# Patient Record
Sex: FEMALE | Race: WHITE | NOT HISPANIC OR LATINO | Employment: OTHER
[De-identification: names, ages, dates, MRNs, and addresses within clinical notes are randomized per-mention and may not be internally consistent; named-entity substitution may affect disease eponyms.]

---

## 2017-04-21 ENCOUNTER — RECORDS - HEALTHEAST (OUTPATIENT)
Dept: ADMINISTRATIVE | Facility: OTHER | Age: 62
End: 2017-04-21

## 2017-04-24 ENCOUNTER — TRANSFERRED RECORDS (OUTPATIENT)
Dept: HEALTH INFORMATION MANAGEMENT | Facility: CLINIC | Age: 62
End: 2017-04-24

## 2017-04-24 LAB
HPV ABSTRACT: NORMAL
PAP-ABSTRACT: NORMAL

## 2017-05-03 ENCOUNTER — RECORDS - HEALTHEAST (OUTPATIENT)
Dept: BONE DENSITY | Facility: CLINIC | Age: 62
End: 2017-05-03

## 2017-05-03 ENCOUNTER — HOSPITAL ENCOUNTER (OUTPATIENT)
Dept: MAMMOGRAPHY | Facility: CLINIC | Age: 62
Discharge: HOME | End: 2017-05-03
Attending: OBSTETRICS & GYNECOLOGY

## 2017-05-03 ENCOUNTER — RECORDS - HEALTHEAST (OUTPATIENT)
Dept: ADMINISTRATIVE | Facility: OTHER | Age: 62
End: 2017-05-03

## 2017-05-03 DIAGNOSIS — N95.1 MENOPAUSAL AND FEMALE CLIMACTERIC STATES: ICD-10-CM

## 2017-05-03 DIAGNOSIS — Z12.31 VISIT FOR SCREENING MAMMOGRAM: ICD-10-CM

## 2017-12-05 ENCOUNTER — RECORDS - HEALTHEAST (OUTPATIENT)
Dept: ADMINISTRATIVE | Facility: OTHER | Age: 62
End: 2017-12-05

## 2018-05-25 ENCOUNTER — RECORDS - HEALTHEAST (OUTPATIENT)
Dept: ADMINISTRATIVE | Facility: OTHER | Age: 63
End: 2018-05-25

## 2018-05-29 ENCOUNTER — RECORDS - HEALTHEAST (OUTPATIENT)
Dept: ADMINISTRATIVE | Facility: OTHER | Age: 63
End: 2018-05-29

## 2018-05-29 ENCOUNTER — RECORDS - HEALTHEAST (OUTPATIENT)
Dept: BONE DENSITY | Facility: CLINIC | Age: 63
End: 2018-05-29

## 2018-05-29 DIAGNOSIS — M81.0 AGE-RELATED OSTEOPOROSIS WITHOUT CURRENT PATHOLOGICAL FRACTURE: ICD-10-CM

## 2019-07-15 ENCOUNTER — HOSPITAL ENCOUNTER (OUTPATIENT)
Dept: MAMMOGRAPHY | Facility: CLINIC | Age: 64
Discharge: HOME | End: 2019-07-15
Attending: OBSTETRICS & GYNECOLOGY

## 2019-07-15 DIAGNOSIS — Z12.31 VISIT FOR SCREENING MAMMOGRAM: ICD-10-CM

## 2020-07-27 ENCOUNTER — TRANSFERRED RECORDS (OUTPATIENT)
Dept: HEALTH INFORMATION MANAGEMENT | Facility: CLINIC | Age: 65
End: 2020-07-27
Payer: COMMERCIAL

## 2020-07-30 ENCOUNTER — RECORDS - HEALTHEAST (OUTPATIENT)
Dept: ADMINISTRATIVE | Facility: OTHER | Age: 65
End: 2020-07-30

## 2021-05-25 ENCOUNTER — RECORDS - HEALTHEAST (OUTPATIENT)
Dept: ADMINISTRATIVE | Facility: CLINIC | Age: 66
End: 2021-05-25

## 2021-05-26 ENCOUNTER — RECORDS - HEALTHEAST (OUTPATIENT)
Dept: ADMINISTRATIVE | Facility: CLINIC | Age: 66
End: 2021-05-26

## 2021-05-28 ENCOUNTER — RECORDS - HEALTHEAST (OUTPATIENT)
Dept: ADMINISTRATIVE | Facility: CLINIC | Age: 66
End: 2021-05-28

## 2021-05-29 ENCOUNTER — RECORDS - HEALTHEAST (OUTPATIENT)
Dept: ADMINISTRATIVE | Facility: CLINIC | Age: 66
End: 2021-05-29

## 2021-05-30 ENCOUNTER — RECORDS - HEALTHEAST (OUTPATIENT)
Dept: ADMINISTRATIVE | Facility: CLINIC | Age: 66
End: 2021-05-30

## 2021-06-22 ENCOUNTER — OFFICE VISIT - HEALTHEAST (OUTPATIENT)
Dept: FAMILY MEDICINE | Facility: CLINIC | Age: 66
End: 2021-06-22

## 2021-06-22 ENCOUNTER — RECORDS - HEALTHEAST (OUTPATIENT)
Dept: ADMINISTRATIVE | Facility: OTHER | Age: 66
End: 2021-06-22

## 2021-06-22 DIAGNOSIS — Z12.31 ENCOUNTER FOR SCREENING MAMMOGRAM FOR BREAST CANCER: ICD-10-CM

## 2021-06-22 DIAGNOSIS — Z13.0 SCREENING, ANEMIA, DEFICIENCY, IRON: ICD-10-CM

## 2021-06-22 DIAGNOSIS — Z11.59 ENCOUNTER FOR HEPATITIS C SCREENING TEST FOR LOW RISK PATIENT: ICD-10-CM

## 2021-06-22 DIAGNOSIS — M81.0 OSTEOPOROSIS, UNSPECIFIED OSTEOPOROSIS TYPE, UNSPECIFIED PATHOLOGICAL FRACTURE PRESENCE: ICD-10-CM

## 2021-06-22 DIAGNOSIS — E78.49 OTHER HYPERLIPIDEMIA: ICD-10-CM

## 2021-06-22 DIAGNOSIS — Z13.228 ENCOUNTER FOR SCREENING FOR METABOLIC DISORDER: ICD-10-CM

## 2021-06-22 DIAGNOSIS — Z00.00 ROUTINE GENERAL MEDICAL EXAMINATION AT A HEALTH CARE FACILITY: ICD-10-CM

## 2021-06-22 DIAGNOSIS — Z00.00 ENCOUNTER FOR PREVENTIVE CARE: ICD-10-CM

## 2021-06-22 LAB
ALBUMIN SERPL-MCNC: 4.3 G/DL (ref 3.5–5)
ALP SERPL-CCNC: 93 U/L (ref 45–120)
ALT SERPL W P-5'-P-CCNC: 15 U/L (ref 0–45)
ANION GAP SERPL CALCULATED.3IONS-SCNC: 12 MMOL/L (ref 5–18)
AST SERPL W P-5'-P-CCNC: 17 U/L (ref 0–40)
BASOPHILS # BLD AUTO: 0 THOU/UL (ref 0–0.2)
BASOPHILS NFR BLD AUTO: 1 % (ref 0–2)
BILIRUB SERPL-MCNC: 0.8 MG/DL (ref 0–1)
BUN SERPL-MCNC: 10 MG/DL (ref 8–22)
CALCIUM SERPL-MCNC: 9.2 MG/DL (ref 8.5–10.5)
CHLORIDE BLD-SCNC: 105 MMOL/L (ref 98–107)
CHOLEST SERPL-MCNC: 204 MG/DL
CO2 SERPL-SCNC: 23 MMOL/L (ref 22–31)
CREAT SERPL-MCNC: 0.68 MG/DL (ref 0.6–1.1)
EOSINOPHIL # BLD AUTO: 0 THOU/UL (ref 0–0.4)
EOSINOPHIL NFR BLD AUTO: 1 % (ref 0–6)
ERYTHROCYTE [DISTWIDTH] IN BLOOD BY AUTOMATED COUNT: 12.6 % (ref 11–14.5)
FASTING STATUS PATIENT QL REPORTED: YES
GFR SERPL CREATININE-BSD FRML MDRD: >60 ML/MIN/1.73M2
GLUCOSE BLD-MCNC: 89 MG/DL (ref 70–125)
HCT VFR BLD AUTO: 40.1 % (ref 35–47)
HDLC SERPL-MCNC: 61 MG/DL
HGB BLD-MCNC: 13.4 G/DL (ref 12–16)
IMM GRANULOCYTES # BLD: 0 THOU/UL
IMM GRANULOCYTES NFR BLD: 0 %
LDLC SERPL CALC-MCNC: 131 MG/DL
LYMPHOCYTES # BLD AUTO: 0.9 THOU/UL (ref 0.8–4.4)
LYMPHOCYTES NFR BLD AUTO: 22 % (ref 20–40)
MCH RBC QN AUTO: 29.6 PG (ref 27–34)
MCHC RBC AUTO-ENTMCNC: 33.4 G/DL (ref 32–36)
MCV RBC AUTO: 89 FL (ref 80–100)
MONOCYTES # BLD AUTO: 0.5 THOU/UL (ref 0–0.9)
MONOCYTES NFR BLD AUTO: 11 % (ref 2–10)
NEUTROPHILS # BLD AUTO: 2.7 THOU/UL (ref 2–7.7)
NEUTROPHILS NFR BLD AUTO: 65 % (ref 50–70)
PLATELET # BLD AUTO: 257 THOU/UL (ref 140–440)
PMV BLD AUTO: 9.5 FL (ref 7–10)
POTASSIUM BLD-SCNC: 4.3 MMOL/L (ref 3.5–5)
PROT SERPL-MCNC: 6.7 G/DL (ref 6–8)
RBC # BLD AUTO: 4.52 MILL/UL (ref 3.8–5.4)
SODIUM SERPL-SCNC: 140 MMOL/L (ref 136–145)
TRIGL SERPL-MCNC: 62 MG/DL
WBC: 4.1 THOU/UL (ref 4–11)

## 2021-06-22 ASSESSMENT — MIFFLIN-ST. JEOR: SCORE: 1092.91

## 2021-06-22 NOTE — ASSESSMENT & PLAN NOTE
Patient does weight bearing exercises but interested in physical therapy to optimize her bone denisty.    She does not want bisphosphonate, but is aware of that option. May reconsider if bone density is worsening. Repeat dexa due now, order placed.

## 2021-06-23 LAB
25(OH)D3 SERPL-MCNC: 63.8 NG/ML (ref 30–80)
HCV AB SERPL QL IA: NEGATIVE

## 2021-06-26 ENCOUNTER — HEALTH MAINTENANCE LETTER (OUTPATIENT)
Age: 66
End: 2021-06-26

## 2021-06-26 NOTE — PATIENT INSTRUCTIONS - HE
Advance Directive  Patient s advance directive was discussed and I am comfortable with the patient s wishes.  Patient Education   Personalized Prevention Plan  You are due for the preventive services outlined below.  Your care team is available to assist you in scheduling these services.  If you have already completed any of these items, please share that information with your care team to update in your medical record.  Health Maintenance Due   Topic Date Due     HEPATITIS C SCREENING  Never done     ADVANCE CARE PLANNING  Never done     ZOSTER VACCINES (2 of 3) 06/15/2015     Pneumococcal Vaccine: 65+ Years (1 of 1 - PPSV23) Never done     COLORECTAL CANCER SCREENING  05/26/2020     LIPID  04/18/2021     MAMMOGRAM  07/15/2021

## 2021-06-26 NOTE — PROGRESS NOTES
Assessment and Plan:   Patient has been advised of split billing requirements and indicates understanding: yes    Problem List Items Addressed This Visit        Unprioritized    Osteoporosis     Patient does weight bearing exercises but interested in physical therapy to optimize her bone denisty.    She does not want bisphosphonate, but is aware of that option. May reconsider if bone density is worsening. Repeat dexa due now, order placed.          Relevant Orders    Ambulatory referral to Adult PT- Internal    Vitamin D, Total (25-Hydroxy)    DXA Bone Density Scan    Other hyperlipidemia     Manages with diet /and naturopath medicine Dr. Hansen.          Relevant Orders    Lipid [place] FASTING    Encounter for preventive care     Covid vaccine - done  Flu shot - recommended in the fall.   Pap: done about 2 years ago by dr. Keen, we will get record.   Mammo: normal 2020 - will get record.   Colonoscopy: done last year, and was normal, repeat in 10 years, will get records.   Std testing desired: declined. but offered  Osteoporosis prevention discussed.  vitamin d levels ordered. Recommend daily calcium and vitamin d intake to keep good bone health. Recommend weight bearing exercise, no tobacco, and limit alcohol  dexa - see osteoporosis.   Recommend sunscreen, exercise, & healthy diet.  Offered cbc, cmp, lipids and asked what other testing she  desires today  I have had an Advance Directives discussion with the patient. She has one and will get a copy.  Body mass index is 22.8 kg/m .   mychart active.            Other Visit Diagnoses     Routine general medical examination at a health care facility    -  Primary    Encounter for screening mammogram for breast cancer        Relevant Orders    Mammo Screening Bilateral    Encounter for hepatitis C screening test for low risk patient        Relevant Orders    Hepatitis C Antibody (Anti-HCV)    Screening, anemia, deficiency, iron        Relevant Orders    HM1(CBC  and Differential)    Encounter for screening for metabolic disorder        Relevant Orders    Comprehensive Metabolic Panel           The patient's current medical problems were reviewed.    I have had an Advance Directives discussion with the patient.  The following health maintenance schedule was reviewed with the patient and provided in printed form in the after visit summary:   Health Maintenance   Topic Date Due     ZOSTER VACCINES (2 of 3) 06/15/2015     Pneumococcal Vaccine: 65+ Years (1 of 1 - PPSV23) Never done     COLORECTAL CANCER SCREENING  05/26/2020     LIPID  04/18/2021     MAMMOGRAM  07/15/2021     INFLUENZA VACCINE RULE BASED (Season Ended) 08/01/2021     MEDICARE ANNUAL WELLNESS VISIT  06/22/2022     FALL RISK ASSESSMENT  06/22/2022     TD 18+ HE  04/14/2025     ADVANCE CARE PLANNING  06/22/2026     DEXA SCAN  05/29/2033     COVID-19 Vaccine  Completed     Pneumococcal Vaccine: Pediatrics (0 to 5 Years) and At-Risk Patients (6 to 64 Years)  Aged Out     HEPATITIS B VACCINES  Aged Out     HEPATITIS C SCREENING  Discontinued       Subjective:   Chief Complaint: Alexia Ayers is an 66 y.o. female here for an Annual Wellness visit.   HPI:  Healthy comes in for establish care and annual wellness visit.     Review of Systems:  Please see above.  The rest of the review of systems are negative for all systems.    Patient Care Team:  Gabrielle Evans MD as PCP - General (Family Medicine)  Jorge Longoria MD [place], deb as Integrated Services ([facility])     Patient Active Problem List   Diagnosis     Basal Cell Carcinoma Of The Skin     Menopause Has Occurred     Osteoporosis     Other hyperlipidemia     Encounter for preventive care     Past Medical History:   Diagnosis Date     Basal cell carcinoma (BCC) of face      Cataract early stages, no treatment     Hyperthyroidism      Infectious viral hepatitis Had as small child hepatitis A      Past Surgical History:   Procedure Laterality Date      APPENDECTOMY  1998     BASAL CELL CARCINOMA EXCISION       COSMETIC SURGERY  reconstruction on face after mohs procedure     HYSTERECTOMY  1998     OOPHORECTOMY Bilateral 1998      Family History   Problem Relation Age of Onset     Breast cancer Paternal Grandmother 60     Lymphoma Mother      Cancer Mother         non-hodgkins lymphoma     Stroke Father 48        carotid aneurysm     Heart attack Brother 48     Early death Brother         heart attack     Heart disease Brother      Obesity Brother      No Medical Problems Daughter      No Medical Problems Daughter      Prostate cancer Brother      Colon cancer Neg Hx       Social History     Socioeconomic History     Marital status:      Spouse name: Not on file     Number of children: Not on file     Years of education: Not on file     Highest education level: Not on file   Occupational History     Not on file   Social Needs     Financial resource strain: Not on file     Food insecurity     Worry: Not on file     Inability: Not on file     Transportation needs     Medical: Not on file     Non-medical: Not on file   Tobacco Use     Smoking status: Never Smoker   Substance and Sexual Activity     Alcohol use: Yes     Alcohol/week: 4.0 standard drinks     Types: 4 Glasses of wine per week     Drug use: No     Sexual activity: Yes     Partners: Male     Birth control/protection: None   Lifestyle     Physical activity     Days per week: Not on file     Minutes per session: Not on file     Stress: Not on file   Relationships     Social connections     Talks on phone: Not on file     Gets together: Not on file     Attends Christianity service: Not on file     Active member of club or organization: Not on file     Attends meetings of clubs or organizations: Not on file     Relationship status: Not on file     Intimate partner violence     Fear of current or ex partner: Not on file     Emotionally abused: Not on file     Physically abused: Not on file     Forced sexual  "activity: Not on file   Other Topics Concern     Not on file   Social History Narrative    6/22/2021 lives with  myke. 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 Likes yoga/ pilates/ traveling/ walking/ knitting/ hanging out with friends/ likes to cook.       Current Outpatient Medications   Medication Sig Dispense Refill     ERGOCALCIFEROL, VITAMIN D2, (VITAMIN D2 ORAL) Take 6,000 Units by mouth daily.        UNABLE TO FIND Med Name: Methylcare bid       UNABLE TO FIND Med Name: Resvoxitrol bid       UNABLE TO FIND Med Name: Osteoblend two times a day       UNABLE TO FIND Med Name: Nattokinase bi       UNABLE TO FIND Med Name: Cortisol manager       UNABLE TO FIND Med Name: Progon/B bid       No current facility-administered medications for this visit.       Objective:   Vital Signs:   Visit Vitals  /74 (Patient Site: Left Arm, Patient Position: Sitting, Cuff Size: Adult Regular)   Pulse 86   Ht 5' 3\" (1.6 m)   Wt 128 lb 11.2 oz (58.4 kg)   SpO2 97%   BMI 22.80 kg/m           VisionScreening:  No exam data present     PHYSICAL EXAM  Physical Exam  Constitutional:       General: She is not in acute distress.     Appearance: She is well-developed.   HENT:      Head: Normocephalic and atraumatic.      Right Ear: Tympanic membrane, ear canal and external ear normal.      Left Ear: Tympanic membrane, ear canal and external ear normal.      Nose: Nose normal.      Mouth/Throat:      Mouth: Mucous membranes are moist.      Pharynx: Oropharynx is clear.   Eyes:      Extraocular Movements: Extraocular movements intact.      Conjunctiva/sclera: Conjunctivae normal.   Neck:      Musculoskeletal: Neck supple.   Cardiovascular:      Rate and Rhythm: Normal rate and regular rhythm.      Heart sounds: Normal heart sounds.   Pulmonary:      Effort: Pulmonary effort is normal.      Breath sounds: Normal breath sounds.   Chest:      Breasts: Breasts are symmetrical.         Right: Normal. No " swelling, bleeding, inverted nipple, mass, nipple discharge, skin change or tenderness.         Left: Normal. No swelling, bleeding, inverted nipple, mass, nipple discharge, skin change or tenderness.   Abdominal:      General: Bowel sounds are normal.      Palpations: Abdomen is soft.   Musculoskeletal: Normal range of motion.   Skin:     General: Skin is warm and dry.   Neurological:      General: No focal deficit present.      Mental Status: She is alert and oriented to person, place, and time.   Psychiatric:         Mood and Affect: Mood normal.         Behavior: Behavior normal.         Thought Content: Thought content normal.         Judgment: Judgment normal.           Assessment Results 6/22/2021   Activities of Daily Living No help needed   Instrumental Activities of Daily Living No help needed   Mini Cog Total Score 5   Some recent data might be hidden     A Mini-Cog score of 0-2 suggests the possibility of dementia, score of 3-5 suggests no dementia    Identified Health Risks:     Patient's advanced directive was discussed and I am comfortable with the patient's wishes.  She has a health care directive.

## 2021-06-29 ENCOUNTER — COMMUNICATION - HEALTHEAST (OUTPATIENT)
Dept: FAMILY MEDICINE | Facility: CLINIC | Age: 66
End: 2021-06-29

## 2021-06-29 DIAGNOSIS — E78.49 OTHER HYPERLIPIDEMIA: ICD-10-CM

## 2021-07-05 PROBLEM — Z00.00 ENCOUNTER FOR PREVENTIVE CARE: Status: ACTIVE | Noted: 2021-06-22

## 2021-07-06 VITALS
SYSTOLIC BLOOD PRESSURE: 132 MMHG | HEIGHT: 63 IN | OXYGEN SATURATION: 97 % | HEART RATE: 86 BPM | DIASTOLIC BLOOD PRESSURE: 74 MMHG | WEIGHT: 128.7 LBS | BODY MASS INDEX: 22.8 KG/M2

## 2021-07-13 ENCOUNTER — RECORDS - HEALTHEAST (OUTPATIENT)
Dept: ADMINISTRATIVE | Facility: CLINIC | Age: 66
End: 2021-07-13

## 2021-07-19 ENCOUNTER — HOSPITAL ENCOUNTER (OUTPATIENT)
Dept: PHYSICAL THERAPY | Facility: REHABILITATION | Age: 66
End: 2021-07-19
Payer: COMMERCIAL

## 2021-07-19 DIAGNOSIS — M25.69 DECREASED RANGE OF MOTION OF TRUNK AND BACK: ICD-10-CM

## 2021-07-19 DIAGNOSIS — M62.81 MUSCLE WEAKNESS (GENERALIZED): ICD-10-CM

## 2021-07-19 DIAGNOSIS — M81.0 OSTEOPOROSIS WITHOUT CURRENT PATHOLOGICAL FRACTURE, UNSPECIFIED OSTEOPOROSIS TYPE: Primary | ICD-10-CM

## 2021-07-19 PROCEDURE — 97161 PT EVAL LOW COMPLEX 20 MIN: CPT | Mod: GP | Performed by: PHYSICAL THERAPIST

## 2021-07-19 PROCEDURE — 97110 THERAPEUTIC EXERCISES: CPT | Mod: GP | Performed by: PHYSICAL THERAPIST

## 2021-07-19 NOTE — DISCHARGE INSTRUCTIONS
STRENGTHENING routine      Try to do with chair behind you so you stay consistent with how deep your squat is.   Squat to stand to bicep curl to overhead press - start with 3 lbs - when 2-3 rounds of 10-15 reps is easy - can move up to 5 lbs      Split squat x10-15 reps 2-3 sets      Counter push ups x10-15 reps 2-3 reps     Back strengthening    x10-15 reps 2-3 sets    STRETCHING - ideally performed daily - after walk or video might feel more stretch ability    Trying to keep elbows and hands touching wall as you raise your arms up overhead x5-10 reps    Variation to try as well (gravity helps so might feel like you can stretch farther)    x5-10 reps

## 2021-07-20 NOTE — PROGRESS NOTES
[facility]        OUTPATIENT PHYSICAL THERAPY FUNCTIONAL EVALUATION  PLAN OF TREATMENT FOR OUTPATIENT REHABILITATION  (COMPLETE FOR INITIAL CLAIMS ONLY)  Patient's Last Name, First Name, M.I.  YOB: 1955  Alexia Ayers     Provider's Name   [facility]   Medical Record No.  [id number]     Start of Care Date:  07/19/21   Onset Date:  06/02/21   Type:     _X__PT   ____OT  ____SLP Medical Diagnosis:  Osteoporosis     PT Diagnosis:  Osteoporosis at risk for fracture with decreased ROM and strength Visits from SOC:  1                              __________________________________________________________________________________  Plan of Treatment/Functional Goals:  neuromuscular re-education, ROM, strengthening (weight bearing osteoporosis home exercise program/postural)           GOALS     Pt to be educated and able to perform weight bearing and postural osteoporosis exercise programming independently and without difficulty in 16 weeks.  11/09/21      Therapy Frequency:  other (see comments) (1x/every 4-6 weeks)   Predicted Duration of Therapy Intervention:   (1x/every 4-6 weeks for 12-16 weeks)    Radha Garcia, PT, DPT, OCS, CLT                                   I CERTIFY THE NEED FOR THESE SERVICES FURNISHED UNDER        THIS PLAN OF TREATMENT AND WHILE UNDER MY CARE     (Physician co-signature of this document indicates review and certification of the therapy plan).                Certification Date From:  07/19/21   Certification Date To:  10/18/21    Referring Provider:  Nathan    Initial Assessment  See Epic Evaluation- Start of Care Date: 07/19/21 07/19/21 1611   Quick Adds   Quick Adds Certification   Type of Visit Initial OP PT Evaluation   General Information   Start of Care Date 07/19/21   Referring Physician Nathan   Orders Evaluate and Treat  as Indicated   Order Date 06/02/21   Medical Diagnosis Osteoporosis   Onset of illness/injury or Date of Surgery 06/02/21   Special Instructions Early 2000s pt's bone density scans showed bone changes - recent MD visit 6/2/21 MD recommended PT   Surgical/Medical history reviewed Yes   Pertinent history of current problem (include personal factors and/or comorbidities that impact the POC) Complete hysterectomy 1998    Prior level of function comment WNL   Diagnostic Tests Bone Scan   Bone Scan Results Results   Bone Scan results osteoporosis   Previous/Current Treatment Other  (no hormonal or medication treatment yet)   Other treatment works with [facility]   Patient role/Employment history Retired  (RN)   Living environment   (house with )   Patient/Family Goals Statement Weight bearing exercise programming   General Information Comments Flexibility in shoulders   Fall Risk Screen   Fall screen completed by PT   Have you fallen 2 or more times in the past year? No   Have you fallen and had an injury in the past year? No   Is patient a fall risk? No   Abuse Screen (yes response referral indicated)   Feels Unsafe at Home or Work/School no   Feels Threatened by Someone no   Does Anyone Try to Keep You From Having Contact with Others or Doing Things Outside Your Home? no   Physical Signs of Abuse Present no   System Outcome Measures   Outcome Measures   (Oswestry)   Functional Scales   Functional Scales and Outcomes oswestry 0%   Pain   Patient currently in pain No   Cognitive Status Examination   Orientation orientation to person, place and time   Level of Consciousness alert   Follows Commands and Answers Questions 100% of the time   Personal Safety and Judgment intact   Posture   Posture Comments Pt demo's increased thoracic kyphosis with forward head and protracted shoulders   Range of Motion (ROM)   ROM Comment Neck, B shoulders, Trunk ROM - min limited with R SB with fingertips to knee jt line but pt also  able to stretch further quickly with repeated reps, B hip WNL without pain, B LE ROM WNL without pain   Strength   Strength Comments B UE and LE grossly 5/5 without pain except B hip flexors 4+/5 and L hip ext 4/5 and R 4+/5, B abd 5/5 except L hip struggled with effort to hold   Bed Mobility   Bed Mobility Comments WNL   Transfer Skills   Transfer Comments WNL   Gait   Gait Comments Mild trendelenberg L > R   Balance   Balance Comments WNL   Planned Therapy Interventions   Planned Therapy Interventions neuromuscular re-education;ROM;strengthening  (weight bearing osteoporosis home exercise program/postural)   Clinical Impression   Criteria for Skilled Therapeutic Interventions Met yes, treatment indicated   PT Diagnosis Osteoporosis at risk for fracture with decreased ROM and strength   Influenced by the following impairments Mild decreased trunk ROM and mild trunk/hip weakness   Functional limitations due to impairments Risk fracture with ADLs and hobbies   Clinical Presentation Stable/Uncomplicated   Clinical Decision Making (Complexity) Low complexity   Therapy Frequency other (see comments)  (1x/every 4-6 weeks)   Predicted Duration of Therapy Intervention (days/wks)   (1x/every 4-6 weeks for 12-16 weeks)   Risk & Benefits of therapy have been explained Yes   Patient, Family & other staff in agreement with plan of care Yes   GOALS   PT Eval Goals 1   Goal 1   Goal Description Pt to be educated and able to perform weight bearing and postural osteoporosis exercise programming independently and without difficulty in 16 weeks.   Target Date 11/09/21   Total Evaluation Time   PT Jesús Low Complexity Minutes (42997) 26   Therapy Certification   Certification date from 07/19/21   Certification date to 10/18/21   Medical Diagnosis Osteoporosis

## 2021-07-21 ENCOUNTER — RECORDS - HEALTHEAST (OUTPATIENT)
Dept: ADMINISTRATIVE | Facility: CLINIC | Age: 66
End: 2021-07-21

## 2021-08-23 ENCOUNTER — HOSPITAL ENCOUNTER (OUTPATIENT)
Dept: PHYSICAL THERAPY | Facility: REHABILITATION | Age: 66
End: 2021-08-23
Payer: COMMERCIAL

## 2021-08-23 DIAGNOSIS — M25.69 DECREASED RANGE OF MOTION OF TRUNK AND BACK: ICD-10-CM

## 2021-08-23 DIAGNOSIS — M81.0 OSTEOPOROSIS WITHOUT CURRENT PATHOLOGICAL FRACTURE, UNSPECIFIED OSTEOPOROSIS TYPE: Primary | ICD-10-CM

## 2021-08-23 DIAGNOSIS — M62.81 MUSCLE WEAKNESS (GENERALIZED): ICD-10-CM

## 2021-08-23 PROCEDURE — 97110 THERAPEUTIC EXERCISES: CPT | Mod: GP | Performed by: PHYSICAL THERAPIST

## 2021-08-23 NOTE — PROGRESS NOTES
Progress note from 7/19-8/23/21 08/23/21 0900   Signing Clinician's Name / Credentials   Signing clinician's name / credentials Radha Garcia PT, DPT, OCS, CLT   Session Number   Session Number 2   Additional Session Number  of 12    Progress Note/Recertification   Progress Note Due Date 08/19/21   Progress Note Completed Date 08/23/21   Recertification Due Date 11/09/21   Adult Goals   PT Eval Goals 1   Subjective Report   Subjective Report Pt reports she is doing her yoga and pilates everyday, stretching up the wall everyday and doing weights every other day. Pt also is continuing with her walking routine - probably 6 days a week with 3-4.5 miles.   Objective Measures   Objective Measures Objective Measure 1   Objective Measure 1   Objective Measure ROM   Details Trunk ROM WNL without pain or stiffness (R SB was min limited)   Treatment Interventions   Interventions Therapeutic Procedure/Exercise   Therapeutic Procedure/exercise   Therapeutic Procedures: strength, endurance, ROM, flexibillity minutes (52436) 40   Skilled Intervention Assessed response to HEP, walking 3-4 miles most days, yoga and pilates routine and HEP. Reassessed trunk ROM and discussed progress. Reviewed, performed and added to HEP per pt instructions and printed AVS for home.   Treatment Detail PT HEP to be added to regular pilates, yoga and walking routine. Exercises - squat to bicep curl to overhead press -- with 5 lb weights, split squat B - can perform walking lunges instead, counter push ups and prone snow nathanael x10-15 reps 2-3 sets 2-3x/week and daily stretching supine or on wall snow angels x5-10 reps, ADDED monster walk, hip pulse with SLS with L2 theraband.   Assessments Completed   Assessments Completed Pt demo's improved trunk ROM to mostly WNL and compliance with HEP for improving weight bearing exercise programming.   Education   Learner Patient   Readiness Eager   Method Booklet/handout   Response Verbalizes Understanding    Plan   Home program HEP   Updates to plan of care Assess response to HEP adding monster walk and SLS with hip pulse 4 way and progress with variations for weight bearing exercises as needed - adding weight to walking lunges.   Total Session Time   Timed Code Treatment Minutes 40   Total Treatment Time (sum of timed and untimed services) 40   AMBULATORY CLINICS ONLY-MEDICAL AND TREATMENT DIAGNOSIS   Medical Diagnosis Osteoporosis   PT Diagnosis Osteoporosis at risk for fracture with decreased ROM and strength

## 2021-08-23 NOTE — DISCHARGE INSTRUCTIONS
"       MONSTER WALK    Walk forwards, backwards and side step both ways with knees slightly bent, keeping tension in the resistance band.    Up and down hallway until feel the \"burn\" then rest x10-20 seconds and stretch hips out and then repeat back  Repeat 2-3 rounds   3x/week       With band tied to low anchor pulse 10-30 pulses per leg per directions 1-2 rounds each leg 3x/week    Can change split squat into walking lunges for more weight bearing and balance challenge x10 reps each side 1-2 rounds 3x/week  "

## 2021-09-09 ENCOUNTER — HOSPITAL ENCOUNTER (OUTPATIENT)
Dept: MAMMOGRAPHY | Facility: CLINIC | Age: 66
Discharge: HOME | End: 2021-09-09
Attending: FAMILY MEDICINE | Admitting: FAMILY MEDICINE
Payer: COMMERCIAL

## 2021-09-09 ENCOUNTER — ANCILLARY PROCEDURE (OUTPATIENT)
Dept: BONE DENSITY | Facility: CLINIC | Age: 66
End: 2021-09-09
Attending: FAMILY MEDICINE
Payer: COMMERCIAL

## 2021-09-09 DIAGNOSIS — M81.0 OSTEOPOROSIS, UNSPECIFIED OSTEOPOROSIS TYPE, UNSPECIFIED PATHOLOGICAL FRACTURE PRESENCE: ICD-10-CM

## 2021-09-09 DIAGNOSIS — Z12.31 ENCOUNTER FOR SCREENING MAMMOGRAM FOR BREAST CANCER: ICD-10-CM

## 2021-09-09 PROCEDURE — 77080 DXA BONE DENSITY AXIAL: CPT | Mod: TC | Performed by: RADIOLOGY

## 2021-09-09 PROCEDURE — 77063 BREAST TOMOSYNTHESIS BI: CPT

## 2021-09-13 ENCOUNTER — IMMUNIZATION (OUTPATIENT)
Dept: FAMILY MEDICINE | Facility: CLINIC | Age: 66
End: 2021-09-13
Payer: COMMERCIAL

## 2021-09-13 DIAGNOSIS — Z23 NEED FOR VACCINATION: Primary | ICD-10-CM

## 2021-09-13 PROCEDURE — 99207 PR NO CHARGE NURSE ONLY: CPT

## 2021-09-13 PROCEDURE — 90662 IIV NO PRSV INCREASED AG IM: CPT

## 2021-09-13 PROCEDURE — G0008 ADMIN INFLUENZA VIRUS VAC: HCPCS

## 2021-10-04 ENCOUNTER — LAB (OUTPATIENT)
Dept: LAB | Facility: CLINIC | Age: 66
End: 2021-10-04
Payer: COMMERCIAL

## 2021-10-04 DIAGNOSIS — E78.49 OTHER HYPERLIPIDEMIA: ICD-10-CM

## 2021-10-04 LAB
CHOLEST SERPL-MCNC: 201 MG/DL
FASTING STATUS PATIENT QL REPORTED: YES
HDLC SERPL-MCNC: 55 MG/DL
LDLC SERPL CALC-MCNC: 131 MG/DL
TRIGL SERPL-MCNC: 73 MG/DL

## 2021-10-04 PROCEDURE — 80061 LIPID PANEL: CPT

## 2021-10-04 PROCEDURE — 36415 COLL VENOUS BLD VENIPUNCTURE: CPT

## 2021-10-12 ENCOUNTER — OFFICE VISIT (OUTPATIENT)
Dept: FAMILY MEDICINE | Facility: CLINIC | Age: 66
End: 2021-10-12
Payer: COMMERCIAL

## 2021-10-12 VITALS
BODY MASS INDEX: 22.85 KG/M2 | DIASTOLIC BLOOD PRESSURE: 74 MMHG | HEART RATE: 62 BPM | OXYGEN SATURATION: 99 % | SYSTOLIC BLOOD PRESSURE: 122 MMHG | WEIGHT: 129 LBS

## 2021-10-12 DIAGNOSIS — E78.49 OTHER HYPERLIPIDEMIA: Primary | ICD-10-CM

## 2021-10-12 DIAGNOSIS — Z00.00 ENCOUNTER FOR PREVENTIVE CARE: ICD-10-CM

## 2021-10-12 DIAGNOSIS — M81.0 OSTEOPOROSIS, UNSPECIFIED OSTEOPOROSIS TYPE, UNSPECIFIED PATHOLOGICAL FRACTURE PRESENCE: ICD-10-CM

## 2021-10-12 PROCEDURE — 99213 OFFICE O/P EST LOW 20 MIN: CPT | Performed by: FAMILY MEDICINE

## 2021-10-12 NOTE — ASSESSMENT & PLAN NOTE
DEXA reveals osteoporosis and worsening bone density.  The patient has changed her lifestyle routine and feels she is doing more weightbearing exercise and has also started supplements through her naturopath doctor.  At this time she declines Fosamax although she understands the risk-benefit ratio which we did review today.  She also understands the Fosamax would be a temporary medication for 5 years.  At this time she would like to recheck her DEXA scan in 1 year and at that time if she is noting worsening osteoporosis she will start Fosamax.

## 2021-10-12 NOTE — PROGRESS NOTES
Assessment & Plan   Problem List Items Addressed This Visit        Endocrine    Other hyperlipidemia - Primary     6/2021 - tchol 204, tg 62, hdl 61, ldl 131  Because of elevated LDL, she has cut out animal fats from her diet in the last 1 month and is trying to decrease the quantity of all fat in the diet.  She would like to recheck her lipids in 6 months.  An order has been placed.  She will make a lab appointment at that time         Relevant Orders    Lipid Profile       Musculoskeletal and Integumentary    Osteoporosis     DEXA reveals osteoporosis and worsening bone density.  The patient has changed her lifestyle routine and feels she is doing more weightbearing exercise and has also started supplements through her naturopath doctor.  At this time she declines Fosamax although she understands the risk-benefit ratio which we did review today.  She also understands the Fosamax would be a temporary medication for 5 years.  At this time she would like to recheck her DEXA scan in 1 year and at that time if she is noting worsening osteoporosis she will start Fosamax.            Other    Encounter for preventive care      zoster vaccine - needs to check on cost  6/22/2021 ess nl cbc, nl cmp  7/30/2020 colonoscopy normal, repeat in 10 yrs  9/12/21 nl mammo                   Patient Instructions   Patient Education     Fosamax Oral Tablet 70 mg  Uses  For bone strength.  Instructions  Swallow the medicine without crushing or chewing it.  Take the medicine with 250 mL (1 cup) of water.  Do not eat or drink for 30 minutes after taking this medicine.  Sit or stand upright for 30 minutes after taking the medicine. Do not lie down.  After taking this medicine, wait 30 minutes before taking any other medicine.  Take the medicine first thing in the morning.  Keep the medicine at room temperature. Avoid heat and direct light.  It is important that you keep taking each dose of this medicine on time even if you are feeling  well.  If you forget to take a dose on time, return to your normal dosing schedule on the following day.  Please tell your doctor and pharmacist about all the medicines you take. Include both prescription and over-the-counter medicines. Also tell them about any vitamins, herbal medicines, or anything else you take for your health.  Visit your dentist regularly. Proper care of your teeth is very important while taking this medicine. Brush your teeth and floss regularly.  Cautions  Tell your doctor and pharmacist if you ever had an allergic reaction to a medicine. Symptoms of an allergic reaction can include trouble breathing, skin rash, itching, swelling, or severe dizziness.  Do not use the medication any more than instructed.  Please check with your doctor before drinking alcohol while on this medicine.  Avoid smoking while on this medicine. Smoking may increase your risk for bone fractures.  Tell the doctor or pharmacist if you are pregnant, planning to be pregnant, or breastfeeding.  Ask your pharmacist if this medicine can interact with any of your other medicines. Be sure to tell them about all the medicines you take.  Please tell all your doctors and dentists that you are on this medicine before they provide care.  Do not start or stop any other medicines without first speaking to your doctor or pharmacist.  Do not share this medicine with anyone who has not been prescribed this medicine.  This medicine can cause serious side effects in some patients. Important information from the [place] Food and Drug Administration (FDA) is available from your pharmacist. Please review it carefully with your pharmacist to understand the risks associated with this medicine.  Side Effects  The following is a list of some common side effects from this medicine. Please speak with your doctor about what you should do if you experience these or other side effects.    nausea    stomach upset or abdominal pain  Call your doctor or get  medical help right away if you notice any of these more serious side effects:    severe or persistent bone, joint or jaw pain    earache    pain in the joints  A few people may have an allergic reactions to this medicine. Symptoms can include difficulty breathing, skin rash, itching, swelling, or severe dizziness. If you notice any of these symptoms, seek medical help quickly.  Extra  Please speak with your doctor, nurse, or pharmacist if you have any questions about this medicine.  [email or web address]  IMPORTANT NOTE: This document tells you briefly how to take your medicine, but it does not tell you all there is to know about it.Your doctor or pharmacist may give you other documents about your medicine. Please talk to them if you have any questions.Always follow their advice. There is a more complete description of this medicine available in English.Scan this code on your smartphone or tablet or use the web address below. You can also ask your pharmacist for a printout. If you have any questions, please ask your pharmacist.     2021 [employer]               Return in about 6 months (around 4/12/2022) for repeat lipids (after lifestyle modifications) - blood draw appt only.    Gabrielle Evans MD  [facility]    Chief Complaint   Patient presents with     follow up Dexa        Subjective   Alexia Malone is a 66 year old who presents for the following health issues   She is here to discuss her bone density scan.  The most recent scan done in September 2021 shows worsening bone density.  However she is made significant lifestyle modifications including improved exercise routine and weightbearing exercise routine.  She also is optimizing her diet and has been working with a naturopath and is now taking bone building supplements.  She would like to wait 1 year before starting a bisphosphonate because she wants to see if her bone density scan will improve with her  current regimen.  She is very reluctant to take any prescription medications and believes supplements are better way to go.          Objective    /74 (BP Location: Left arm, Patient Position: Left side, Cuff Size: Adult Regular)   Pulse 62   Wt 58.5 kg (129 lb)   SpO2 99%   BMI 22.85 kg/m    Body mass index is 22.85 kg/m .  Physical Exam  Constitutional:       Appearance: Normal appearance.   HENT:      Head: Normocephalic and atraumatic.   Cardiovascular:      Rate and Rhythm: Normal rate and regular rhythm.   Pulmonary:      Effort: Pulmonary effort is normal.   Musculoskeletal:         General: Normal range of motion.      Cervical back: Normal range of motion and neck supple.   Neurological:      General: No focal deficit present.      Mental Status: She is alert and oriented to person, place, and time.

## 2021-10-12 NOTE — PATIENT INSTRUCTIONS
Patient Education     Fosamax Oral Tablet 70 mg  Uses  For bone strength.  Instructions  Swallow the medicine without crushing or chewing it.  Take the medicine with 250 mL (1 cup) of water.  Do not eat or drink for 30 minutes after taking this medicine.  Sit or stand upright for 30 minutes after taking the medicine. Do not lie down.  After taking this medicine, wait 30 minutes before taking any other medicine.  Take the medicine first thing in the morning.  Keep the medicine at room temperature. Avoid heat and direct light.  It is important that you keep taking each dose of this medicine on time even if you are feeling well.  If you forget to take a dose on time, return to your normal dosing schedule on the following day.  Please tell your doctor and pharmacist about all the medicines you take. Include both prescription and over-the-counter medicines. Also tell them about any vitamins, herbal medicines, or anything else you take for your health.  Visit your dentist regularly. Proper care of your teeth is very important while taking this medicine. Brush your teeth and floss regularly.  Cautions  Tell your doctor and pharmacist if you ever had an allergic reaction to a medicine. Symptoms of an allergic reaction can include trouble breathing, skin rash, itching, swelling, or severe dizziness.  Do not use the medication any more than instructed.  Please check with your doctor before drinking alcohol while on this medicine.  Avoid smoking while on this medicine. Smoking may increase your risk for bone fractures.  Tell the doctor or pharmacist if you are pregnant, planning to be pregnant, or breastfeeding.  Ask your pharmacist if this medicine can interact with any of your other medicines. Be sure to tell them about all the medicines you take.  Please tell all your doctors and dentists that you are on this medicine before they provide care.  Do not start or stop any other medicines without first speaking to your doctor  or pharmacist.  Do not share this medicine with anyone who has not been prescribed this medicine.  This medicine can cause serious side effects in some patients. Important information from the [place] Food and Drug Administration (FDA) is available from your pharmacist. Please review it carefully with your pharmacist to understand the risks associated with this medicine.  Side Effects  The following is a list of some common side effects from this medicine. Please speak with your doctor about what you should do if you experience these or other side effects.    nausea    stomach upset or abdominal pain  Call your doctor or get medical help right away if you notice any of these more serious side effects:    severe or persistent bone, joint or jaw pain    earache    pain in the joints  A few people may have an allergic reactions to this medicine. Symptoms can include difficulty breathing, skin rash, itching, swelling, or severe dizziness. If you notice any of these symptoms, seek medical help quickly.  Extra  Please speak with your doctor, nurse, or pharmacist if you have any questions about this medicine.  [email or web address]  IMPORTANT NOTE: This document tells you briefly how to take your medicine, but it does not tell you all there is to know about it.Your doctor or pharmacist may give you other documents about your medicine. Please talk to them if you have any questions.Always follow their advice. There is a more complete description of this medicine available in English.Scan this code on your smartphone or tablet or use the web address below. You can also ask your pharmacist for a printout. If you have any questions, please ask your pharmacist.     2021 [email or web address]

## 2021-10-12 NOTE — ASSESSMENT & PLAN NOTE
6/2021 - tchol 204, tg 62, hdl 61, ldl 131  Because of elevated LDL, she has cut out animal fats from her diet in the last 1 month and is trying to decrease the quantity of all fat in the diet.  She would like to recheck her lipids in 6 months.  An order has been placed.  She will make a lab appointment at that time

## 2021-10-12 NOTE — ASSESSMENT & PLAN NOTE
zoster vaccine - needs to check on cost  6/22/2021 ess nl cbc, nl cmp  7/30/2020 colonoscopy normal, repeat in 10 yrs  9/12/21 nl mammo

## 2021-10-18 ENCOUNTER — HOSPITAL ENCOUNTER (OUTPATIENT)
Dept: PHYSICAL THERAPY | Facility: REHABILITATION | Age: 66
End: 2021-10-18
Payer: COMMERCIAL

## 2021-10-18 DIAGNOSIS — M81.0 OSTEOPOROSIS WITHOUT CURRENT PATHOLOGICAL FRACTURE, UNSPECIFIED OSTEOPOROSIS TYPE: Primary | ICD-10-CM

## 2021-10-18 DIAGNOSIS — M25.69 DECREASED RANGE OF MOTION OF TRUNK AND BACK: ICD-10-CM

## 2021-10-18 DIAGNOSIS — M62.81 MUSCLE WEAKNESS (GENERALIZED): ICD-10-CM

## 2021-10-18 PROCEDURE — 97110 THERAPEUTIC EXERCISES: CPT | Mod: GP | Performed by: PHYSICAL THERAPIST

## 2021-10-18 NOTE — PROGRESS NOTES
Progress Note       10/18/21 0900   Signing Clinician's Name / Credentials   Signing clinician's name / credentials Radha Garcia PT, DPT, OCS, CLT   Session Number   Session Number 3   Additional Session Number  of 12    Progress Note/Recertification   Progress Note Due Date 09/23/21   Progress Note Completed Date 10/18/21   Recertification Due Date 11/09/21   Adult Goals   PT Eval Goals 1   Goal 1   Goal Description Pt to be educated and able to perform weight bearing and postural osteoporosis exercise programming independently and without difficulty in 16 weeks.   Goal Progress IMPROVING - pt able to feel stronger with increased reps and difficulty of her exercise routine   Target Date 11/09/21   Subjective Report   Subjective Report Pt reports she has new DEXA scan and her spine numbers have changed in an unusual pattern. Scans from years ago were -3.8 and then -2.7 and then -3.3 most recently so results are mixed in if pt's bone quality is actually decreasing or not. Pt has talked to MD about it and she is going to hold on starting any medication yet. Pt reports she has been working on PT HEP 3x/week and doing well with it - feeling stronger from performing and reps are getting easier.    Objective Measures   Objective Measures Objective Measure 1   Objective Measure 1   Objective Measure ROM   Details Able to long sit stretch on floor towards feet without pain or limitation in back, able to perform advanced standing weight bearing exercises without pain or difficulty today   Treatment Interventions   Interventions Therapeutic Procedure/Exercise   Therapeutic Procedure/exercise   Therapeutic Procedures: strength, endurance, ROM, flexibillity minutes ([id number]) 54   Skilled Intervention Reassessed response to HEP, walking 3-4 miles, yoga and pilates routine and HEP. Reviewed, performed and added to HEP variations to weight bearing exercises for strengthening and improved bone quality per pt instructions and printed  AVS for home. Discussed rotational stretches.   Treatment Detail PT HEP to be added to regular pilates, yoga and walking routine. Exercises - squat to bicep curl to overhead press -- with 5 lb weights, split squat B - can perform walking lunges instead, counter push ups and prone snow nathanael x10-15 reps 2-3 sets 2-3x/week and daily stretching supine or on wall snow angels x5-10 reps, monster walk, hip pulse with SLS with L2 theraband. ADDED sumo, narrow and side step squat variations with fwd shoulder raises or triceps extension overhead, and RDL and SLS RDL.   Assessments Completed   Assessments Completed Pt reports compliance with all of her exercise routines and benefits from knowledge on exercise variation to keep compliance high.   Education   Learner Patient   Readiness Eager   Method Booklet/handout   Response Verbalizes Understanding   Plan   Home program HEP   Updates to plan of care Assess response to HEP adding variations for weight bearing exercises and RDL. Reassess trunk ROM and progress back/spinal stretch as able.   Comments   Comments Pt reports compliance with all of her exercise routines and benefits from knowledge on exercise variation to keep compliance high.   Total Session Time   Timed Code Treatment Minutes 54   Total Treatment Time (sum of timed and untimed services) 54   AMBULATORY CLINICS ONLY-MEDICAL AND TREATMENT DIAGNOSIS   Medical Diagnosis Osteoporosis   PT Diagnosis Osteoporosis at risk for fracture with decreased ROM and strength

## 2021-10-18 NOTE — DISCHARGE INSTRUCTIONS
"Sit to stand variations    - Sumo squat - wide feet and toes out with weight into front squat position with  triceps extension overhead      - Narrow base with shoulder forward raise and over raise    Arms can go straight in front    Monster walk variation    - Can side step down hallway into side step squats - could do this with weights and add arm side lifts out to side       Back strengthening options    - Hip hinge position and can do arm raises out to side or along body or up to a \"Y\"             Ind[place]ian Dead Lift (RDL)      OR CAN DO single leg for more balance challenge        "

## 2021-11-24 ENCOUNTER — OFFICE VISIT (OUTPATIENT)
Dept: FAMILY MEDICINE | Facility: CLINIC | Age: 66
End: 2021-11-24
Payer: COMMERCIAL

## 2021-11-24 VITALS
BODY MASS INDEX: 23.14 KG/M2 | HEIGHT: 63 IN | HEART RATE: 72 BPM | OXYGEN SATURATION: 99 % | WEIGHT: 130.6 LBS | DIASTOLIC BLOOD PRESSURE: 74 MMHG | SYSTOLIC BLOOD PRESSURE: 122 MMHG

## 2021-11-24 DIAGNOSIS — Z01.818 PREOP GENERAL PHYSICAL EXAM: ICD-10-CM

## 2021-11-24 DIAGNOSIS — H25.093 AGE-RELATED INCIPIENT CATARACT OF BOTH EYES: Primary | ICD-10-CM

## 2021-11-24 PROCEDURE — 99214 OFFICE O/P EST MOD 30 MIN: CPT | Performed by: FAMILY MEDICINE

## 2021-11-24 ASSESSMENT — ENCOUNTER SYMPTOMS
COLOR CHANGE: 0
SEIZURES: 0
PALPITATIONS: 0
HEMATURIA: 0
FEVER: 0
BACK PAIN: 0
VOMITING: 0
CHILLS: 0
SORE THROAT: 0
DYSURIA: 0
ABDOMINAL PAIN: 0
ARTHRALGIAS: 0
EYE PAIN: 0
SHORTNESS OF BREATH: 0
COUGH: 0

## 2021-11-24 ASSESSMENT — MIFFLIN-ST. JEOR: SCORE: 1101.53

## 2021-11-24 NOTE — ASSESSMENT & PLAN NOTE
preop done today.     Surgical Information:  Surgery/Procedure: Cataract  Surgery Location: [facility]  Surgeon: Dr Wilde  Surgery Date: 12/3 and 12/10

## 2021-11-24 NOTE — PATIENT INSTRUCTIONS

## 2021-11-24 NOTE — PROGRESS NOTES
[address] [facility] [address]  Phone: [phone]  Fax: [phone]  Primary Provider: Gabrielle Evans        PREOPERATIVE EVALUATION:  Today's date: 11/24/2021    Alexia Ayers is a 66 year old female who presents for a preoperative evaluation.    Surgical Information:  Surgery/Procedure: Cataract  Surgery Location: [facility]  Surgeon: Dr Wilde  Surgery Date: 12/3 and 12/10  Where patient plans to recover: At home with family  Fax number for surgical facility: [phone]    Type of Anesthesia Anticipated: Local and Local with MAC    Assessment & Plan     The proposed surgical procedure is considered LOW risk.    Problem List Items Addressed This Visit        Other    Age-related incipient cataract of both eyes - Primary     preop done today.     Surgical Information:  Surgery/Procedure: Cataract  Surgery Location: [facility]  Surgeon: Dr Wilde  Surgery Date: 12/3 and 12/10         Relevant Orders    Asymptomatic COVID-19 Virus (Coronavirus) by PCR    Asymptomatic COVID-19 Virus (Coronavirus) by PCR      Other Visit Diagnoses     Preop general physical exam        Relevant Orders    Asymptomatic COVID-19 Virus (Coronavirus) by PCR    Asymptomatic COVID-19 Virus (Coronavirus) by PCR               Risks and Recommendations:  The patient has the following additional risks and recommendations for perioperative complications:   - No identified additional risk factors other than previously addressed    Medication Instructions:  Patient is on no chronic medications    RECOMMENDATION:  APPROVAL GIVEN to proceed with proposed procedure, without further diagnostic evaluation.        Subjective     HPI related to upcoming procedure: cataract surgery recommended by opthamologist    Preop Questions 11/23/2021   1. Have you ever had a heart attack or stroke? No   2. Have you ever had surgery on your heart or blood  vessels, such as a stent placement, a coronary artery bypass, or surgery on an artery in your head, neck, heart, or legs? No   3. Do you have chest pain with activity? No   4. Do you have a history of  heart failure? No   5. Do you currently have a cold, bronchitis or symptoms of other infection? No   6. Do you have a cough, shortness of breath, or wheezing? No   7. Do you or anyone in your family have previous history of blood clots? No   8. Do you or does anyone in your family have a serious bleeding problem such as prolonged bleeding following surgeries or cuts? No   9. Have you ever had problems with anemia or been told to take iron pills? No   10. Have you had any abnormal blood loss such as black, tarry or bloody stools, or abnormal vaginal bleeding? No   11. Have you ever had a blood transfusion? No   12. Are you willing to have a blood transfusion if it is medically needed before, during, or after your surgery? Yes   13. Have you or any of your relatives ever had problems with anesthesia? No   14. Do you have sleep apnea, excessive snoring or daytime drowsiness? No   15. Do you have any artifical heart valves or other implanted medical devices like a pacemaker, defibrillator, or continuous glucose monitor? No   16. Do you have artificial joints? No   17. Are you allergic to latex? No       Health Care Directive:  Patient does not have a Health Care Directive or Living Will: Patient states has Advance Directive and will bring in a copy to clinic.    Preoperative Review of :   reviewed - no record of controlled substances prescribed.      Review of Systems   Constitutional: Negative for chills and fever.   HENT: Negative for ear pain and sore throat.    Eyes: Negative for pain and visual disturbance.   Respiratory: Negative for cough and shortness of breath.    Cardiovascular: Negative for chest pain and palpitations.   Gastrointestinal: Negative for abdominal pain and vomiting.   Genitourinary: Negative  for dysuria and hematuria.   Musculoskeletal: Negative for arthralgias and back pain.   Skin: Negative for color change and rash.   Neurological: Negative for seizures and syncope.   All other systems reviewed and are negative.        Patient Active Problem List    Diagnosis Date Noted     Age-related incipient cataract of both eyes 11/24/2021     Priority: Medium     Encounter for preventive care 06/22/2021     Priority: Medium     Last Assessment & Plan:   Formatting of this note might be different from the original.  Covid vaccine - done  Flu shot - recommended in the fall.   Pap: done about 2 years ago by dr. Keen, we will get record.   Mammo: normal 2020 - will get record.   Colonoscopy: done last year, and was normal, repeat in 10 years, will get records.   Std testing desired: declined. but offered  Osteoporosis prevention discussed.  vitamin d levels ordered. Recommend daily calcium and vitamin d intake to keep good bone health. Recommend weight bearing exercise, no tobacco, and limit alcohol  dexa - see osteoporosis.   Recommend sunscreen, exercise, & healthy diet.  Offered cbc, cmp, lipids and asked what other testing she  desires today  I have had an Advance Directives discussion with the patient. She has one and will get a copy.  Body mass index is 22.8 kg/m .   mychart active.       Other hyperlipidemia      Priority: Medium     Created by Conversion  Replacement Utility updated for latest IMO load      Formatting of this note might be different from the original.  Created by Conversion    Replacement Utility updated for latest IMO load    Last Assessment & Plan:   Formatting of this note might be different from the original.  Manages with diet /and naturopath medicine Dr. Hansen.       Basal Cell Carcinoma Of The Skin      Priority: Medium     Created by Conversion  Replacement Utility updated for latest IMO load      Formatting of this note might be different from the original.  Created by  Conversion    Replacement Utility updated for latest IMO load    Last Assessment & Plan:   Formatting of this note might be different from the original.  Sees Dermatologist.       Menopause Has Occurred      Priority: Medium     Created by Conversion  Replacement Utility updated for latest IMO load      Formatting of this note might be different from the original.  Created by Conversion    Replacement Utility updated for latest IMO load       Osteoporosis      Priority: Medium     Created by Conversion  Replacement Utility updated for latest IMO load      Formatting of this note might be different from the original.  Created by Conversion    Replacement Utility updated for latest IMO load    Last Assessment & Plan:   Formatting of this note might be different from the original.  Patient does weight bearing exercises but interested in physical therapy to optimize her bone denisty.    She does not want bisphosphonate, but is aware of that option. May reconsider if bone density is worsening. Repeat dexa due now, order placed.       Diverticular disease of colon 05/26/2010     Priority: Medium      Past Medical History:   Diagnosis Date     Basal cell carcinoma (BCC) of face      Cataract early stages, no treatment     Hyperthyroidism      Infectious viral hepatitis Had as small child hepatitis A     Past Surgical History:   Procedure Laterality Date     APPENDECTOMY  1998     BASAL CELL CARCINOMA EXCISION       COSMETIC SURGERY  reconstruction on face after m     HYSTERECTOMY  1998     OOPHORECTOMY Bilateral 1998     Current Outpatient Medications   Medication Sig Dispense Refill     ERGOCALCIFEROL, VITAMIN D2, (VITAMIN D2 ORAL) [ERGOCALCIFEROL, VITAMIN D2, (VITAMIN D2 ORAL)] Take 6,000 Units by mouth daily.        UNABLE TO FIND MEDICATION NAME Cholest Pure TID       UNABLE TO FIND MEDICATION NAME: Methyl B complex 1 daily       UNABLE TO FIND MEDICATION NAME: Bone builder extra BID       UNABLE TO FIND [UNABLE TO  "FIND] Med Name: Resvoxitrol bid       UNABLE TO FIND [UNABLE TO FIND] Med Name: Osteoblend two times a day       UNABLE TO FIND [UNABLE TO FIND] Med Name: Nattokinase bi       UNABLE TO FIND [UNABLE TO FIND] Med Name: Cortisol manager       UNABLE TO FIND [UNABLE TO FIND] Med Name: Progon/B bid         No Known Allergies     Social History     Tobacco Use     Smoking status: Never Smoker     Smokeless tobacco: Never Used   Substance Use Topics     Alcohol use: Yes     Alcohol/week: 4.0 standard drinks     Family History   Problem Relation Age of Onset     Breast Cancer Paternal Grandmother 60.00     Lymphoma Mother      Cancer Mother         non-hodgkins lymphoma     Cerebrovascular Disease Father 48.00        carotid aneurysm     Coronary Artery Disease Brother 48.00     Early Death Brother         heart attack     Heart Disease Brother      Obesity Brother      No Known Problems Daughter      No Known Problems Daughter      Prostate Cancer Brother      Colon Cancer No family hx of      History   Drug Use No         Objective     /74 (BP Location: Left arm, Patient Position: Left side, Cuff Size: Adult Regular)   Pulse 72   Ht 1.6 m (5' 3\")   Wt 59.2 kg (130 lb 9.6 oz)   SpO2 99%   BMI 23.13 kg/m      Physical Exam  Constitutional:       Appearance: Normal appearance.   HENT:      Head: Normocephalic and atraumatic.      Right Ear: Tympanic membrane, ear canal and external ear normal.      Left Ear: Tympanic membrane, ear canal and external ear normal.      Nose: Nose normal.      Mouth/Throat:      Mouth: Mucous membranes are moist.      Pharynx: Oropharynx is clear.   Eyes:      Extraocular Movements: Extraocular movements intact.      Conjunctiva/sclera: Conjunctivae normal.      Pupils: Pupils are equal, round, and reactive to light.   Cardiovascular:      Rate and Rhythm: Normal rate and regular rhythm.      Heart sounds: Normal heart sounds.   Pulmonary:      Effort: Pulmonary effort is normal.     "  Breath sounds: Normal breath sounds.   Abdominal:      General: Bowel sounds are normal.      Palpations: Abdomen is soft.   Musculoskeletal:         General: Normal range of motion.      Cervical back: Normal range of motion and neck supple.   Neurological:      General: No focal deficit present.      Mental Status: She is alert and oriented to person, place, and time.   Psychiatric:         Mood and Affect: Mood normal.         Behavior: Behavior normal.         Thought Content: Thought content normal.         Judgment: Judgment normal.         Recent Labs   Lab Test 06/22/21  0824   HGB 13.4         POTASSIUM 4.3   CR 0.68        Revised Cardiac Risk Index (RCRI):  The patient has the following serious cardiovascular risks for perioperative complications:   - No serious cardiac risks = 0 points     RCRI Interpretation: 0 points: Class I (very low risk - 0.4% complication rate)           Signed Electronically by: Gabrielle Evans MD  Copy of this evaluation report is provided to requesting physician.

## 2021-11-29 ENCOUNTER — LAB (OUTPATIENT)
Dept: LAB | Facility: CLINIC | Age: 66
End: 2021-11-29
Attending: FAMILY MEDICINE
Payer: COMMERCIAL

## 2021-11-29 DIAGNOSIS — H25.093 AGE-RELATED INCIPIENT CATARACT OF BOTH EYES: ICD-10-CM

## 2021-11-29 DIAGNOSIS — Z01.818 PREOP GENERAL PHYSICAL EXAM: ICD-10-CM

## 2021-11-29 PROCEDURE — U0005 INFEC AGEN DETEC AMPLI PROBE: HCPCS

## 2021-11-29 PROCEDURE — U0003 INFECTIOUS AGENT DETECTION BY NUCLEIC ACID (DNA OR RNA); SEVERE ACUTE RESPIRATORY SYNDROME CORONAVIRUS 2 (SARS-COV-2) (CORONAVIRUS DISEASE [COVID-19]), AMPLIFIED PROBE TECHNIQUE, MAKING USE OF HIGH THROUGHPUT TECHNOLOGIES AS DESCRIBED BY CMS-2020-01-R: HCPCS

## 2021-11-30 LAB — SARS-COV-2 RNA RESP QL NAA+PROBE: NEGATIVE

## 2021-12-30 ENCOUNTER — HOSPITAL ENCOUNTER (OUTPATIENT)
Dept: PHYSICAL THERAPY | Facility: REHABILITATION | Age: 66
End: 2021-12-30
Payer: COMMERCIAL

## 2021-12-30 DIAGNOSIS — M81.0 OSTEOPOROSIS WITHOUT CURRENT PATHOLOGICAL FRACTURE, UNSPECIFIED OSTEOPOROSIS TYPE: Primary | ICD-10-CM

## 2021-12-30 DIAGNOSIS — M25.69 DECREASED RANGE OF MOTION OF TRUNK AND BACK: ICD-10-CM

## 2021-12-30 DIAGNOSIS — M62.81 MUSCLE WEAKNESS (GENERALIZED): ICD-10-CM

## 2021-12-30 PROCEDURE — 97110 THERAPEUTIC EXERCISES: CPT | Mod: GP | Performed by: PHYSICAL THERAPIST

## 2021-12-30 NOTE — PROGRESS NOTES
[facility]    OUTPATIENT PHYSICAL THERAPY  PLAN OF TREATMENT FOR OUTPATIENT REHABILITATION AND PROGRESS NOTE           Patient's Last Name, First Name, Alexia Mckenzie Date of Birth  1955   Provider's Name  [facility] Medical Record No.  [id number]    Onset Date  6/2/21 Start of Care Date  7/19/21   Type:     _X_PT   ___OT   ___SLP Medical Diagnosis  Osteoporosis   PT Diagnosis  Osteoporosis at risk for fracture with decreased ROM and strength Plan of Treatment  Frequency/Duration: 1x/every 4-6 weeks  Certification date from 10/19/21 to 1/17/22     Goals:  See below         I CERTIFY THE NEED FOR THESE SERVICES FURNISHED UNDER        THIS PLAN OF TREATMENT AND WHILE UNDER MY CARE     (Physician co-signature of this document indicates review and certification of the therapy plan).                Referring Provider: Dr. Gabrielle Garcia, PT, DPT, OCS, CLT       12/30/21 1100   Signing Clinician's Name / Credentials   Signing clinician's name / credentials Radha Garcia PT, DPT, OCS, CLT   Session Number   Session Number 3   Additional Session Number  of 12    Progress Note/Recertification   Progress Note Due Date 11/18/21   Progress Note Completed Date 12/30/21   Recertification Due Date 10/18/21   Recertification Completed Date  12/30/21   Adult Goals   PT Eval Goals 1   Goal 1   Goal Description Pt to be educated and able to perform weight bearing and postural osteoporosis exercise programming independently and without difficulty in 16 weeks.   Goal Progress MET - pt understands importance of weight bearing and strength training and flexibility exercise practice and is independent with performing   Target Date 11/09/21   Date Met 12/30/21   Subjective Report   Subjective Report Pt reports she has been doing well with her exercises. Pt has  incorporated weights into her pilates videos, will do 30 minutes of her PT HEP, and will do some walking videos on prime and you tube.    Objective Measures   Objective Measures Objective Measure 1;Objective Measure 2   Objective Measure 1   Objective Measure ROM   Details Trunk ROM WNL without difficulty - pt garth's improved upper back flexibility with upright posture and overhead reach with less difficulty, pt can feel challenge with squat stretch with arms extended overhead   Objective Measure 2   Objective Measure Strength    Details B hip flexors 5/5 (were 4+/5), R hip ext 5-/5 (was 4+/5) and L hip ext 5-/5 (was 4/5)   Treatment Interventions   Interventions Therapeutic Procedure/Exercise   Therapeutic Procedure/exercise   Therapeutic Procedures: strength, endurance, ROM, flexibillity minutes (75754) 34   Skilled Intervention Reassessed response to HEP, walking 3-4 miles or doing walking videos, yoga and pilates routine and HEP.  Reassessed trunk ROM and LE strength and discussed progress. Reviewed HEP and discussed long term intention with exercise routines for weight bearing and strength training. Educated pt on modifications to arm position with upper body strengthening to decrease strain on left shoulder. Reviewed PT HEP and added advanced upper back active stretch to HEP per pt instructions and printed AVS for home.   Treatment Detail PT HEP to be added to regular pilates, yoga and walking routine. Exercises - squat to bicep curl to overhead press -- with 5 lb weights, split squat B - can perform walking lunges instead, counter push ups and prone snow nathanael x10-15 reps 2-3 sets 2-3x/week and daily stretching supine or on wall snow angels x5-10 reps, monster walk, hip pulse with SLS with L2 theraband. ADDED sumo, narrow and side step squat variations with fwd shoulder raises or triceps extension overhead, and RDL and SLS RDL. ADDED squat with active UE overhead reach stretch isometric x5-10 reps daily    Education   Learner Patient   Readiness Eager   Method Booklet/handout   Response Verbalizes Understanding   Plan   Home program HEP   Plan for next session Discharge to  with HEP with pt knowledgeable on how to progress/variations of exercise for long term success. Thank you for this referral!   Comments   Comments Pt reports compliance with all of her exercise routines and feels well educated on how to change her exercise programming with intentions towards weight bearing, posture and strength training understood. Pt has also improved upper body flexibility and LE strength.   Total Session Time   Timed Code Treatment Minutes 34   Total Treatment Time (sum of timed and untimed services) 34   AMBULATORY CLINICS ONLY-MEDICAL AND TREATMENT DIAGNOSIS   Medical Diagnosis Osteoporosis   PT Diagnosis Osteoporosis at risk for fracture with decreased ROM and strength

## 2022-04-12 ENCOUNTER — LAB (OUTPATIENT)
Dept: LAB | Facility: CLINIC | Age: 67
End: 2022-04-12
Payer: COMMERCIAL

## 2022-04-12 DIAGNOSIS — E78.49 OTHER HYPERLIPIDEMIA: ICD-10-CM

## 2022-04-12 LAB
CHOLEST SERPL-MCNC: 206 MG/DL
FASTING STATUS PATIENT QL REPORTED: YES
HDLC SERPL-MCNC: 60 MG/DL
LDLC SERPL CALC-MCNC: 135 MG/DL
TRIGL SERPL-MCNC: 57 MG/DL

## 2022-04-12 PROCEDURE — 36415 COLL VENOUS BLD VENIPUNCTURE: CPT

## 2022-04-12 PROCEDURE — 80061 LIPID PANEL: CPT

## 2022-08-25 ASSESSMENT — ENCOUNTER SYMPTOMS
CHILLS: 0
PALPITATIONS: 0
SORE THROAT: 0
DYSURIA: 0
HEADACHES: 0
WEAKNESS: 0
BREAST MASS: 0
NAUSEA: 0
DIZZINESS: 0
ARTHRALGIAS: 0
SHORTNESS OF BREATH: 0
HEMATURIA: 0
CONSTIPATION: 0
COUGH: 0
HEARTBURN: 0
MYALGIAS: 0
JOINT SWELLING: 0
ABDOMINAL PAIN: 0
FREQUENCY: 0
FEVER: 0
EYE PAIN: 0
NERVOUS/ANXIOUS: 0
DIARRHEA: 0
PARESTHESIAS: 0
HEMATOCHEZIA: 0

## 2022-08-25 ASSESSMENT — ACTIVITIES OF DAILY LIVING (ADL): CURRENT_FUNCTION: NO ASSISTANCE NEEDED

## 2022-08-31 ENCOUNTER — OFFICE VISIT (OUTPATIENT)
Dept: FAMILY MEDICINE | Facility: CLINIC | Age: 67
End: 2022-08-31
Payer: COMMERCIAL

## 2022-08-31 VITALS
RESPIRATION RATE: 14 BRPM | OXYGEN SATURATION: 98 % | HEIGHT: 63 IN | HEART RATE: 73 BPM | BODY MASS INDEX: 22.48 KG/M2 | DIASTOLIC BLOOD PRESSURE: 72 MMHG | WEIGHT: 126.9 LBS | SYSTOLIC BLOOD PRESSURE: 124 MMHG

## 2022-08-31 DIAGNOSIS — Z13.21 SCREENING FOR ENDOCRINE, NUTRITIONAL, METABOLIC AND IMMUNITY DISORDER: ICD-10-CM

## 2022-08-31 DIAGNOSIS — M81.0 OSTEOPOROSIS, UNSPECIFIED OSTEOPOROSIS TYPE, UNSPECIFIED PATHOLOGICAL FRACTURE PRESENCE: ICD-10-CM

## 2022-08-31 DIAGNOSIS — Z13.29 SCREENING FOR ENDOCRINE, NUTRITIONAL, METABOLIC AND IMMUNITY DISORDER: ICD-10-CM

## 2022-08-31 DIAGNOSIS — Z13.0 SCREENING, ANEMIA, DEFICIENCY, IRON: Primary | ICD-10-CM

## 2022-08-31 DIAGNOSIS — K57.30 DIVERTICULAR DISEASE OF COLON: ICD-10-CM

## 2022-08-31 DIAGNOSIS — C44.91 BASAL CELL CARCINOMA (BCC), UNSPECIFIED SITE: ICD-10-CM

## 2022-08-31 DIAGNOSIS — Z12.31 ENCOUNTER FOR SCREENING MAMMOGRAM FOR MALIGNANT NEOPLASM OF BREAST: ICD-10-CM

## 2022-08-31 DIAGNOSIS — Z13.228 SCREENING FOR ENDOCRINE, NUTRITIONAL, METABOLIC AND IMMUNITY DISORDER: ICD-10-CM

## 2022-08-31 DIAGNOSIS — Z00.00 ENCOUNTER FOR PREVENTIVE CARE: ICD-10-CM

## 2022-08-31 DIAGNOSIS — Z13.0 SCREENING FOR ENDOCRINE, NUTRITIONAL, METABOLIC AND IMMUNITY DISORDER: ICD-10-CM

## 2022-08-31 DIAGNOSIS — E78.49 OTHER HYPERLIPIDEMIA: ICD-10-CM

## 2022-08-31 DIAGNOSIS — Z13.228 SCREENING FOR METABOLIC DISORDER: ICD-10-CM

## 2022-08-31 LAB
CHOLEST SERPL-MCNC: 238 MG/DL
ERYTHROCYTE [DISTWIDTH] IN BLOOD BY AUTOMATED COUNT: 13 % (ref 10–15)
HCT VFR BLD AUTO: 40.5 % (ref 35–47)
HDLC SERPL-MCNC: 70 MG/DL
HGB BLD-MCNC: 13.3 G/DL (ref 11.7–15.7)
LDLC SERPL CALC-MCNC: 153 MG/DL
MCH RBC QN AUTO: 29.5 PG (ref 26.5–33)
MCHC RBC AUTO-ENTMCNC: 32.8 G/DL (ref 31.5–36.5)
MCV RBC AUTO: 90 FL (ref 78–100)
NONHDLC SERPL-MCNC: 168 MG/DL
PLATELET # BLD AUTO: 249 10E3/UL (ref 150–450)
RBC # BLD AUTO: 4.51 10E6/UL (ref 3.8–5.2)
TRIGL SERPL-MCNC: 77 MG/DL
WBC # BLD AUTO: 4.3 10E3/UL (ref 4–11)

## 2022-08-31 PROCEDURE — 80053 COMPREHEN METABOLIC PANEL: CPT | Performed by: FAMILY MEDICINE

## 2022-08-31 PROCEDURE — 85027 COMPLETE CBC AUTOMATED: CPT | Performed by: FAMILY MEDICINE

## 2022-08-31 PROCEDURE — 82306 VITAMIN D 25 HYDROXY: CPT | Performed by: FAMILY MEDICINE

## 2022-08-31 PROCEDURE — 90670 PCV13 VACCINE IM: CPT | Performed by: FAMILY MEDICINE

## 2022-08-31 PROCEDURE — 36415 COLL VENOUS BLD VENIPUNCTURE: CPT | Performed by: FAMILY MEDICINE

## 2022-08-31 PROCEDURE — 99397 PER PM REEVAL EST PAT 65+ YR: CPT | Mod: 25 | Performed by: FAMILY MEDICINE

## 2022-08-31 PROCEDURE — G0009 ADMIN PNEUMOCOCCAL VACCINE: HCPCS | Performed by: FAMILY MEDICINE

## 2022-08-31 PROCEDURE — 80061 LIPID PANEL: CPT | Performed by: FAMILY MEDICINE

## 2022-08-31 RX ORDER — VALACYCLOVIR HYDROCHLORIDE 1 G/1
TABLET, FILM COATED ORAL
COMMUNITY
Start: 2022-02-28 | End: 2022-08-31

## 2022-08-31 ASSESSMENT — ACTIVITIES OF DAILY LIVING (ADL): CURRENT_FUNCTION: NO ASSISTANCE NEEDED

## 2022-08-31 ASSESSMENT — ENCOUNTER SYMPTOMS
HEMATURIA: 0
CONSTIPATION: 0
MYALGIAS: 0
COUGH: 0
PALPITATIONS: 0
FEVER: 0
FREQUENCY: 0
PARESTHESIAS: 0
SHORTNESS OF BREATH: 0
BREAST MASS: 0
EYE PAIN: 0
NAUSEA: 0
ABDOMINAL PAIN: 0
DIARRHEA: 0
CHILLS: 0
HEMATOCHEZIA: 0
WEAKNESS: 0
DYSURIA: 0
SORE THROAT: 0
JOINT SWELLING: 0
HEADACHES: 0
ARTHRALGIAS: 0
DIZZINESS: 0
NERVOUS/ANXIOUS: 0
HEARTBURN: 0

## 2022-08-31 NOTE — PROGRESS NOTES
"SUBJECTIVE:   Alexia Ayers is a 67 year old female who presents for Preventive Visit.    Patient has been advised of split billing requirements and indicates understanding: Yes  Are you in the first 12 months of your Medicare coverage?  No    Healthy Habits:     In general, how would you rate your overall health?  Excellent    Frequency of exercise:  6-7 days/week    Duration of exercise:  Greater than 60 minutes    Do you usually eat at least 4 servings of fruit and vegetables a day, include whole grains    & fiber and avoid regularly eating high fat or \"junk\" foods?  Yes    Taking medications regularly:  Yes    Medication side effects:  Not applicable    Ability to successfully perform activities of daily living:  No assistance needed    Home Safety:  No safety concerns identified    Hearing Impairment:  No hearing concerns    In the past 6 months, have you been bothered by leaking of urine?  No    In general, how would you rate your overall mental or emotional health?  Excellent      PHQ-2 Total Score: 0    Additional concerns today:  No  Imm/Inj  Pertinent negatives include no abdominal pain, arthralgias, chest pain, chills, congestion, coughing, fever, headaches, joint swelling, myalgias, nausea, rash, sore throat or weakness.     Do you feel safe in your environment? Yes    Have you ever done Advance Care Planning? (For example, a Health Directive, POLST, or a discussion with a medical provider or your loved ones about your wishes): Yes, patient states has an Advance Care Planning document and will bring a copy to the clinic.    Fall risk  Fallen 2 or more times in the past year?: No  Any fall with injury in the past year?: No    Cognitive Screening   1) Repeat 3 items (Leader, Season, Table)    2) Clock draw: NORMAL  3) 3 item recall: Recalls 3 objects  Results: 3 items recalled: COGNITIVE IMPAIRMENT LESS LIKELY    Mini-CogTM Copyright S Yulisa. Licensed by the author for use in [facility]; " reprinted with permission ([email or web address]). All rights reserved.      Do you have sleep apnea, excessive snoring or daytime drowsiness?: no    Reviewed and updated as needed this visit by clinical staff   Tobacco  Allergies  Meds  Problems  Med Hx  Surg Hx  Fam Hx  Soc   Hx          Reviewed and updated as needed this visit by Provider   Tobacco  Allergies  Meds  Problems  Med Hx  Surg Hx  Fam Hx  Soc   Hx         Social History     Tobacco Use     Smoking status: Never Smoker     Smokeless tobacco: Never Used   Substance Use Topics     Alcohol use: Yes     Alcohol/week: 4.0 standard drinks         Alcohol Use 8/25/2022   Prescreen: >3 drinks/day or >7 drinks/week? No     Current providers sharing in care for this patient include:   Patient Care Team:  Gabrielle Evans MD as PCP - General (Family Medicine)  Gabrielle Evans MD as Assigned PCP  Avinash Longoria MD as Resident (Dermatology)    The following health maintenance items are reviewed in Epic and correct as of today:  Health Maintenance Due   Topic Date Due     Pneumococcal Vaccine: 65+ Years (2 - PCV) 06/22/2022     INFLUENZA VACCINE (1) 09/01/2022       FHS-7:   Breast CA Risk Assessment (FHS-7) 9/9/2021 11/23/2021 8/25/2022   Did any of your first-degree relatives have breast or ovarian cancer? No No Yes   Did any of your relatives have bilateral breast cancer? No No No   Did any man in your family have breast cancer? No No No   Did any woman in your family have breast and ovarian cancer? No Yes Yes   Did any woman in your family have breast cancer before age 50 y? No No No   Do you have 2 or more relatives with breast and/or ovarian cancer? No No No   Do you have 2 or more relatives with breast and/or bowel cancer? No No No       Mammogram Screening: Recommended mammography every 1-2 years with patient discussion and risk factor consideration  Pertinent mammograms are reviewed under the imaging tab.    Review of Systems  "  Constitutional: Negative for chills and fever.   HENT: Negative for congestion, ear pain, hearing loss and sore throat.    Eyes: Negative for pain and visual disturbance.   Respiratory: Negative for cough and shortness of breath.    Cardiovascular: Negative for chest pain, palpitations and peripheral edema.   Gastrointestinal: Negative for abdominal pain, constipation, diarrhea, heartburn, hematochezia and nausea.   Breasts:  Negative for tenderness, breast mass and discharge.   Genitourinary: Negative for dysuria, frequency, genital sores, hematuria, pelvic pain, urgency, vaginal bleeding and vaginal discharge.   Musculoskeletal: Negative for arthralgias, joint swelling and myalgias.   Skin: Negative for rash.   Neurological: Negative for dizziness, weakness, headaches and paresthesias.   Psychiatric/Behavioral: Negative for mood changes. The patient is not nervous/anxious.          OBJECTIVE:   /72 (BP Location: Left arm, Patient Position: Sitting, Cuff Size: Adult Regular)   Pulse 73   Resp 14   Ht 1.6 m (5' 3\")   Wt 57.6 kg (126 lb 14.4 oz)   SpO2 98%   BMI 22.48 kg/m   Estimated body mass index is 22.48 kg/m  as calculated from the following:    Height as of this encounter: 1.6 m (5' 3\").    Weight as of this encounter: 57.6 kg (126 lb 14.4 oz).  Physical Exam  Constitutional:       Appearance: Normal appearance.   HENT:      Head: Normocephalic and atraumatic.      Right Ear: Tympanic membrane, ear canal and external ear normal.      Left Ear: Tympanic membrane, ear canal and external ear normal.      Nose: Nose normal.      Mouth/Throat:      Mouth: Mucous membranes are moist.      Pharynx: Oropharynx is clear.   Eyes:      Conjunctiva/sclera: Conjunctivae normal.      Pupils: Pupils are equal, round, and reactive to light.   Cardiovascular:      Rate and Rhythm: Normal rate and regular rhythm.      Heart sounds: Normal heart sounds.   Pulmonary:      Effort: Pulmonary effort is normal.      " Breath sounds: Normal breath sounds.   Chest:      Comments: Declined, no indicaiton  Abdominal:      General: Bowel sounds are normal.      Palpations: Abdomen is soft.   Genitourinary:     Comments: Declined, no indicaiton    Musculoskeletal:         General: Normal range of motion.      Cervical back: Normal range of motion and neck supple.   Skin:     General: Skin is warm.      Capillary Refill: Capillary refill takes less than 2 seconds.   Neurological:      General: No focal deficit present.      Mental Status: She is alert and oriented to person, place, and time.   Psychiatric:         Mood and Affect: Mood normal.         Behavior: Behavior normal.         Thought Content: Thought content normal.         Judgment: Judgment normal.       ASSESSMENT / PLAN:     Problem List Items Addressed This Visit        Digestive    Diverticular disease of colon     Historical issue, keep on problem list              Endocrine    Other hyperlipidemia     Plan to recheck lipids today, also offered coronary calcium score.           Relevant Orders    Lipid Profile    CT Coronary Calcium Scan       Musculoskeletal and Integumentary    Basal Cell Carcinoma Of The Skin     Annual dermatology visits recommended, she says she sees dermDr. Longoria           Osteoporosis     Repeat DEXA now, if worsened, revisit consideration of Fosamax.  Patient declines Fosamax at this time.           Relevant Orders    DX Hip/Pelvis/Spine       Other    Encounter for preventive care     Contraception - menppause  pneumo vax done, flu shot in fall.   Pap: no indication.  Mammo: 9/9/2021  Colonoscopy: due for screening 2030  Std testing desired:  offered  Osteoporosis prevention discussed.  vitamin d levels ordered. Recommend daily calcium and vitamin d intake to keep good bone health. Recommend weight bearing exercise, no tobacco, and limit alcohol  dexa - see osteoporosis in prob list  Recommend sunscreen, exercise, & healthy  "diet.  Offered cbc, cmp, lipids and asked what other testing she  desires today  I have had an Advance Directives discussion with the patient.   Body mass index is 22.48 kg/m .   mychart active             Other Visit Diagnoses     Screening, anemia, deficiency, iron    -  Primary    Relevant Orders    CBC with platelets    Screening for metabolic disorder        Relevant Orders    Comprehensive metabolic panel (BMP + Alb, Alk Phos, ALT, AST, Total. Bili, TP)    Screening for endocrine, nutritional, metabolic and immunity disorder        Relevant Orders    Vitamin D Deficiency    Encounter for screening mammogram for malignant neoplasm of breast        Relevant Orders    MA Screen Bilateral w/Paulo          Patient has been advised of split billing requirements and indicates understanding: Yes    COUNSELING:  Reviewed preventive health counseling, as reflected in patient instructions       Regular exercise       Healthy diet/nutrition       Osteoporosis prevention/bone health       Safe sex practices/STD prevention       Colon cancer screening       Advanced Planning     Estimated body mass index is 22.48 kg/m  as calculated from the following:    Height as of this encounter: 1.6 m (5' 3\").    Weight as of this encounter: 57.6 kg (126 lb 14.4 oz).        She reports that she has never smoked. She has never used smokeless tobacco.      Appropriate preventive services were discussed with this patient, including applicable screening as appropriate for cardiovascular disease, diabetes, osteopenia/osteoporosis, and glaucoma.  As appropriate for age/gender, discussed screening for colorectal cancer, prostate cancer, breast cancer, and cervical cancer. Checklist reviewing preventive services available has been given to the patient.    Reviewed patients plan of care and provided an AVS. The Basic Care Plan (routine screening as documented in Health Maintenance) for Alexia meets the Care Plan requirement. This Care Plan has been " established and reviewed with the Patient.    Gabrielle Evans MD  [facility]    Identified Health Risks:

## 2022-08-31 NOTE — ASSESSMENT & PLAN NOTE
Repeat DEXA now, if worsened, revisit consideration of Fosamax.  Patient declines Fosamax at this time.

## 2022-08-31 NOTE — ASSESSMENT & PLAN NOTE
Contraception - menppause  pneumo vax done, flu shot in fall.   Pap: no indication.  Mammo: 9/9/2021  Colonoscopy: due for screening 2030  Std testing desired:  offered  Osteoporosis prevention discussed.  vitamin d levels ordered. Recommend daily calcium and vitamin d intake to keep good bone health. Recommend weight bearing exercise, no tobacco, and limit alcohol  dexa - see osteoporosis in prob list  Recommend sunscreen, exercise, & healthy diet.  Offered cbc, cmp, lipids and asked what other testing she  desires today  I have had an Advance Directives discussion with the patient.   Body mass index is 22.48 kg/m .   mychart active

## 2022-09-01 LAB — DEPRECATED CALCIDIOL+CALCIFEROL SERPL-MC: 68 UG/L (ref 20–75)

## 2022-09-02 LAB
ALBUMIN SERPL BCG-MCNC: 4.6 G/DL (ref 3.5–5.2)
ALP SERPL-CCNC: 71 U/L (ref 35–104)
ALT SERPL W P-5'-P-CCNC: 20 U/L (ref 10–35)
ANION GAP SERPL CALCULATED.3IONS-SCNC: 9 MMOL/L (ref 7–15)
AST SERPL W P-5'-P-CCNC: 20 U/L (ref 10–35)
BILIRUB SERPL-MCNC: 0.9 MG/DL
BUN SERPL-MCNC: 10.9 MG/DL (ref 8–23)
CALCIUM SERPL-MCNC: 9.6 MG/DL (ref 8.8–10.2)
CHLORIDE SERPL-SCNC: 103 MMOL/L (ref 98–107)
CREAT SERPL-MCNC: 0.69 MG/DL (ref 0.51–0.95)
DEPRECATED HCO3 PLAS-SCNC: 28 MMOL/L (ref 22–29)
GFR SERPL CREATININE-BSD FRML MDRD: >90 ML/MIN/1.73M2
GLUCOSE SERPL-MCNC: 79 MG/DL (ref 70–99)
POTASSIUM SERPL-SCNC: 4.6 MMOL/L (ref 3.4–5.3)
PROT SERPL-MCNC: 6.9 G/DL (ref 6.4–8.3)
SODIUM SERPL-SCNC: 140 MMOL/L (ref 136–145)

## 2022-09-07 ENCOUNTER — MYC MEDICAL ADVICE (OUTPATIENT)
Dept: FAMILY MEDICINE | Facility: CLINIC | Age: 67
End: 2022-09-07

## 2022-09-25 ENCOUNTER — HEALTH MAINTENANCE LETTER (OUTPATIENT)
Age: 67
End: 2022-09-25

## 2022-09-27 ENCOUNTER — HOSPITAL ENCOUNTER (OUTPATIENT)
Dept: CT IMAGING | Facility: CLINIC | Age: 67
Discharge: HOME | End: 2022-09-27
Attending: FAMILY MEDICINE

## 2022-09-27 ENCOUNTER — ANCILLARY PROCEDURE (OUTPATIENT)
Dept: BONE DENSITY | Facility: CLINIC | Age: 67
End: 2022-09-27
Attending: FAMILY MEDICINE
Payer: COMMERCIAL

## 2022-09-27 ENCOUNTER — HOSPITAL ENCOUNTER (OUTPATIENT)
Dept: MAMMOGRAPHY | Facility: CLINIC | Age: 67
Discharge: HOME | End: 2022-09-27
Attending: FAMILY MEDICINE

## 2022-09-27 DIAGNOSIS — E78.49 OTHER HYPERLIPIDEMIA: ICD-10-CM

## 2022-09-27 DIAGNOSIS — Z12.31 ENCOUNTER FOR SCREENING MAMMOGRAM FOR MALIGNANT NEOPLASM OF BREAST: ICD-10-CM

## 2022-09-27 DIAGNOSIS — M81.0 OSTEOPOROSIS, UNSPECIFIED OSTEOPOROSIS TYPE, UNSPECIFIED PATHOLOGICAL FRACTURE PRESENCE: ICD-10-CM

## 2022-09-27 LAB
CV CALCIUM SCORE AGATSTON LM: 0
CV CALCIUM SCORING AGATSON LAD: 0
CV CALCIUM SCORING AGATSTON CX: 0
CV CALCIUM SCORING AGATSTON RCA: 0
CV CALCIUM SCORING AGATSTON TOTAL: 0

## 2022-09-27 PROCEDURE — 75571 CT HRT W/O DYE W/CA TEST: CPT

## 2022-09-27 PROCEDURE — 77067 SCR MAMMO BI INCL CAD: CPT

## 2022-09-27 PROCEDURE — 75571 CT HRT W/O DYE W/CA TEST: CPT | Mod: 26 | Performed by: GENERAL ACUTE CARE HOSPITAL

## 2022-09-27 PROCEDURE — 77080 DXA BONE DENSITY AXIAL: CPT | Mod: TC | Performed by: RADIOLOGY

## 2023-01-23 ENCOUNTER — TRANSFERRED RECORDS (OUTPATIENT)
Dept: HEALTH INFORMATION MANAGEMENT | Facility: CLINIC | Age: 68
End: 2023-01-23

## 2023-01-31 ENCOUNTER — TRANSFERRED RECORDS (OUTPATIENT)
Dept: HEALTH INFORMATION MANAGEMENT | Facility: CLINIC | Age: 68
End: 2023-01-31

## 2023-08-29 ASSESSMENT — ENCOUNTER SYMPTOMS
DYSURIA: 0
SORE THROAT: 0
FREQUENCY: 0
COUGH: 0
PALPITATIONS: 0
DIZZINESS: 0
FEVER: 0
DIARRHEA: 0
SHORTNESS OF BREATH: 0
WEAKNESS: 0
ABDOMINAL PAIN: 0
NAUSEA: 0
PARESTHESIAS: 0
HEADACHES: 0
HEMATOCHEZIA: 0
JOINT SWELLING: 0
NERVOUS/ANXIOUS: 0
MYALGIAS: 0
CHILLS: 0
EYE PAIN: 0
HEMATURIA: 0
BREAST MASS: 0
ARTHRALGIAS: 0
HEARTBURN: 0
CONSTIPATION: 0

## 2023-08-29 ASSESSMENT — ACTIVITIES OF DAILY LIVING (ADL): CURRENT_FUNCTION: NO ASSISTANCE NEEDED

## 2023-09-05 ENCOUNTER — OFFICE VISIT (OUTPATIENT)
Dept: FAMILY MEDICINE | Facility: CLINIC | Age: 68
End: 2023-09-05
Payer: COMMERCIAL

## 2023-09-05 VITALS
HEART RATE: 91 BPM | TEMPERATURE: 97 F | WEIGHT: 131 LBS | RESPIRATION RATE: 16 BRPM | OXYGEN SATURATION: 100 % | SYSTOLIC BLOOD PRESSURE: 139 MMHG | BODY MASS INDEX: 23.21 KG/M2 | DIASTOLIC BLOOD PRESSURE: 66 MMHG | HEIGHT: 63 IN

## 2023-09-05 DIAGNOSIS — M81.0 OSTEOPOROSIS, UNSPECIFIED OSTEOPOROSIS TYPE, UNSPECIFIED PATHOLOGICAL FRACTURE PRESENCE: ICD-10-CM

## 2023-09-05 DIAGNOSIS — E78.49 OTHER HYPERLIPIDEMIA: ICD-10-CM

## 2023-09-05 DIAGNOSIS — Z00.00 ANNUAL PHYSICAL EXAM: Primary | ICD-10-CM

## 2023-09-05 DIAGNOSIS — Z23 NEED FOR PROPHYLACTIC VACCINATION AND INOCULATION AGAINST INFLUENZA: ICD-10-CM

## 2023-09-05 DIAGNOSIS — E55.9 VITAMIN D DEFICIENCY: ICD-10-CM

## 2023-09-05 LAB
ALBUMIN SERPL BCG-MCNC: 4.8 G/DL (ref 3.5–5.2)
ALP SERPL-CCNC: 81 U/L (ref 35–104)
ALT SERPL W P-5'-P-CCNC: 19 U/L (ref 0–50)
ANION GAP SERPL CALCULATED.3IONS-SCNC: 11 MMOL/L (ref 7–15)
AST SERPL W P-5'-P-CCNC: 23 U/L (ref 0–45)
BILIRUB SERPL-MCNC: 0.6 MG/DL
BUN SERPL-MCNC: 10.2 MG/DL (ref 8–23)
CALCIUM SERPL-MCNC: 9.3 MG/DL (ref 8.8–10.2)
CHLORIDE SERPL-SCNC: 106 MMOL/L (ref 98–107)
CHOLEST SERPL-MCNC: 260 MG/DL
CREAT SERPL-MCNC: 0.67 MG/DL (ref 0.51–0.95)
DEPRECATED HCO3 PLAS-SCNC: 25 MMOL/L (ref 22–29)
ERYTHROCYTE [DISTWIDTH] IN BLOOD BY AUTOMATED COUNT: 13.1 % (ref 10–15)
GFR SERPL CREATININE-BSD FRML MDRD: >90 ML/MIN/1.73M2
GLUCOSE SERPL-MCNC: 99 MG/DL (ref 70–99)
HCT VFR BLD AUTO: 42.6 % (ref 35–47)
HDLC SERPL-MCNC: 69 MG/DL
HGB BLD-MCNC: 13.5 G/DL (ref 11.7–15.7)
LDLC SERPL CALC-MCNC: 173 MG/DL
MCH RBC QN AUTO: 29 PG (ref 26.5–33)
MCHC RBC AUTO-ENTMCNC: 31.7 G/DL (ref 31.5–36.5)
MCV RBC AUTO: 91 FL (ref 78–100)
NONHDLC SERPL-MCNC: 191 MG/DL
PLATELET # BLD AUTO: 281 10E3/UL (ref 150–450)
POTASSIUM SERPL-SCNC: 4.4 MMOL/L (ref 3.4–5.3)
PROT SERPL-MCNC: 7.1 G/DL (ref 6.4–8.3)
RBC # BLD AUTO: 4.66 10E6/UL (ref 3.8–5.2)
SODIUM SERPL-SCNC: 142 MMOL/L (ref 136–145)
TRIGL SERPL-MCNC: 88 MG/DL
WBC # BLD AUTO: 4.9 10E3/UL (ref 4–11)

## 2023-09-05 PROCEDURE — 90662 IIV NO PRSV INCREASED AG IM: CPT | Performed by: INTERNAL MEDICINE

## 2023-09-05 PROCEDURE — 80053 COMPREHEN METABOLIC PANEL: CPT | Performed by: INTERNAL MEDICINE

## 2023-09-05 PROCEDURE — G0008 ADMIN INFLUENZA VIRUS VAC: HCPCS | Performed by: INTERNAL MEDICINE

## 2023-09-05 PROCEDURE — 36415 COLL VENOUS BLD VENIPUNCTURE: CPT | Performed by: INTERNAL MEDICINE

## 2023-09-05 PROCEDURE — G0439 PPPS, SUBSEQ VISIT: HCPCS | Performed by: INTERNAL MEDICINE

## 2023-09-05 PROCEDURE — 82306 VITAMIN D 25 HYDROXY: CPT | Performed by: INTERNAL MEDICINE

## 2023-09-05 PROCEDURE — 80061 LIPID PANEL: CPT | Performed by: INTERNAL MEDICINE

## 2023-09-05 PROCEDURE — 85027 COMPLETE CBC AUTOMATED: CPT | Performed by: INTERNAL MEDICINE

## 2023-09-05 PROCEDURE — 99213 OFFICE O/P EST LOW 20 MIN: CPT | Mod: 25 | Performed by: INTERNAL MEDICINE

## 2023-09-05 ASSESSMENT — ENCOUNTER SYMPTOMS
CONSTIPATION: 0
DYSURIA: 0
SORE THROAT: 0
ARTHRALGIAS: 0
FREQUENCY: 0
SHORTNESS OF BREATH: 0
DIZZINESS: 0
HEMATURIA: 0
HEADACHES: 0
PALPITATIONS: 0
FEVER: 0
DIARRHEA: 0
MYALGIAS: 0
COUGH: 0
ABDOMINAL PAIN: 0
JOINT SWELLING: 0
WEAKNESS: 0
PARESTHESIAS: 0
HEMATOCHEZIA: 0
HEARTBURN: 0
NAUSEA: 0
NERVOUS/ANXIOUS: 0
CHILLS: 0
BREAST MASS: 0
EYE PAIN: 0

## 2023-09-05 ASSESSMENT — ACTIVITIES OF DAILY LIVING (ADL): CURRENT_FUNCTION: NO ASSISTANCE NEEDED

## 2023-09-05 ASSESSMENT — PAIN SCALES - GENERAL: PAINLEVEL: NO PAIN (0)

## 2023-09-05 NOTE — PATIENT INSTRUCTIONS
You are due for mammogram.  Please call the following number to make appointment :  [phone]  It is located in [address]

## 2023-09-05 NOTE — PROGRESS NOTES
"SUBJECTIVE:   Alexia Malone is a 68 year old who presents for Preventive Visit.    Are you in the first 12 months of your Medicare coverage?  No    Healthy Habits:     In general, how would you rate your overall health?  Excellent    Frequency of exercise:  6-7 days/week    Duration of exercise:  Greater than 60 minutes    Do you usually eat at least 4 servings of fruit and vegetables a day, include whole grains    & fiber and avoid regularly eating high fat or \"junk\" foods?  Yes    Taking medications regularly:  Yes    Medication side effects:  None    Ability to successfully perform activities of daily living:  No assistance needed    Home Safety:  No safety concerns identified    Hearing Impairment:  No hearing concerns    In the past 6 months, have you been bothered by leaking of urine?  No    In general, how would you rate your overall mental or emotional health?  Excellent    Additional concerns today:  No    Jade is a very pleasant 68-year-old lady who presented to the clinic to establish care and for annual physical exam.  She recently moved to [place] from [place].  She does yoga and pilates, meditations.   Sees a naturopathic doctor as wel.    Osteoporosis- not on medication, prefers not to be medicated for this, dexa every 2 years. Does strength exercises. Takes a calcium and vitamin D supplement.    Colonoscopy in 2020 - normal - rec follow up in 10 year  Hyperlipidemia - coronary calcium score is 0. No statin indicated.   Due for mammogram this year.     Breast cancer in paternal grandmother - postemenopausal.  CAD in father and brother both at 48.    No smoking  ETOH: 2 drinks per week.  Retired RN - [employer]    Have you ever done Advance Care Planning? (For example, a Health Directive, POLST, or a discussion with a medical provider or your loved ones about your wishes): Yes, patient states has an Advance Care Planning document and will bring a copy to the clinic.       Fall risk  Fallen 2 or more times " in the past year?: No  Any fall with injury in the past year?: No    Cognitive Screening   1) Repeat 3 items (Leader, Season, Table)    2) Clock draw: NORMAL  3) 3 item recall: Recalls 3 objects  Results: 3 items recalled: COGNITIVE IMPAIRMENT LESS LIKELY    Mini-CogTM Copyright SADIE Márquez. Licensed by the author for use in [facility]; reprinted with permission ([email or web address]). All rights reserved.      Do you have sleep apnea, excessive snoring or daytime drowsiness? : no    Social History     Tobacco Use     Smoking status: Never     Smokeless tobacco: Never   Substance Use Topics     Alcohol use: Yes     Alcohol/week: 4.0 standard drinks of alcohol         8/29/2023     9:29 AM   Alcohol Use   Prescreen: >3 drinks/day or >7 drinks/week? No       Do you have a current opioid prescription? No  Do you use any other controlled substances or medications that are not prescribed by a provider? None    Current providers sharing in care for this patient include:   Patient Care Team:  Nell Faulkner MD as PCP - Gabrielle Davies MD as Assigned PCP  Avinash Longoria MD as Resident (Dermatology)    The following health maintenance items are reviewed in Epic and correct as of today:  Health Maintenance   Topic Date Due     ANNUAL REVIEW OF HM ORDERS  10/12/2022     PHQ-2 (once per calendar year)  01/01/2023     COVID-19 Vaccine (6 - Moderna series) 01/22/2023     INFLUENZA VACCINE (1) 09/01/2023     MEDICARE ANNUAL WELLNESS VISIT  08/31/2023     FALL RISK ASSESSMENT  09/05/2024     MAMMO SCREENING  09/27/2024     DTAP/TDAP/TD IMMUNIZATION (2 - Td or Tdap) 04/14/2025     LIPID  08/31/2027     ADVANCE CARE PLANNING  08/31/2027     COLORECTAL CANCER SCREENING  07/30/2030     DEXA  09/27/2037     Pneumococcal Vaccine: 65+ Years  Completed     ZOSTER IMMUNIZATION  Completed     IPV IMMUNIZATION  Aged Out     HPV IMMUNIZATION  Aged Out     MENINGITIS IMMUNIZATION  Aged Out     HEPATITIS C SCREENING   Discontinued     FHS-7:       9/9/2021     8:25 AM 11/23/2021     7:12 PM 8/25/2022     6:22 AM 9/27/2022     2:22 PM 8/29/2023     9:31 AM   Breast CA Risk Assessment (FHS-7)   Did any of your first-degree relatives have breast or ovarian cancer? No No Yes No Yes   Did any of your relatives have bilateral breast cancer? No No No No No   Did any man in your family have breast cancer? No No No No No   Did any woman in your family have breast and ovarian cancer? No Yes Yes No Yes   Did any woman in your family have breast cancer before age 50 y? No No No No No   Do you have 2 or more relatives with breast and/or ovarian cancer? No No No No No   Do you have 2 or more relatives with breast and/or bowel cancer? No No No No No     Mammogram Screening: Recommended mammography every 1-2 years with patient discussion and risk factor consideration  Pertinent mammograms are reviewed under the imaging tab.    Review of Systems   Constitutional:  Negative for chills and fever.   HENT:  Negative for congestion, ear pain, hearing loss and sore throat.    Eyes:  Negative for pain and visual disturbance.   Respiratory:  Negative for cough and shortness of breath.    Cardiovascular:  Negative for chest pain, palpitations and peripheral edema.   Gastrointestinal:  Negative for abdominal pain, constipation, diarrhea, heartburn, hematochezia and nausea.   Breasts:  Negative for tenderness, breast mass and discharge.   Genitourinary:  Negative for dysuria, frequency, genital sores, hematuria, pelvic pain, urgency, vaginal bleeding and vaginal discharge.   Musculoskeletal:  Negative for arthralgias, joint swelling and myalgias.   Skin:  Negative for rash.   Neurological:  Negative for dizziness, weakness, headaches and paresthesias.   Psychiatric/Behavioral:  Negative for mood changes. The patient is not nervous/anxious.      OBJECTIVE:   There were no vitals taken for this visit. Estimated body mass index is 22.48 kg/m  as calculated  "from the following:    Height as of 8/31/22: 1.6 m (5' 3\").    Weight as of 8/31/22: 57.6 kg (126 lb 14.4 oz).  Physical Exam  Vitals reviewed.   Constitutional:       Appearance: Normal appearance.   HENT:      Right Ear: Tympanic membrane normal. There is no impacted cerumen.      Left Ear: Tympanic membrane normal. There is no impacted cerumen.      Mouth/Throat:      Mouth: Mucous membranes are moist.      Pharynx: Oropharynx is clear. No oropharyngeal exudate or posterior oropharyngeal erythema.   Cardiovascular:      Rate and Rhythm: Normal rate and regular rhythm.      Heart sounds: Normal heart sounds. No murmur heard.     No gallop.   Pulmonary:      Effort: Pulmonary effort is normal. No respiratory distress.      Breath sounds: Normal breath sounds. No stridor. No wheezing, rhonchi or rales.   Abdominal:      General: Abdomen is flat. There is no distension.      Palpations: Abdomen is soft. There is no mass.      Tenderness: There is no abdominal tenderness. There is no guarding.      Hernia: No hernia is present.   Musculoskeletal:         General: Normal range of motion.      Cervical back: Normal range of motion and neck supple. No rigidity or tenderness.      Right lower leg: No edema.      Left lower leg: No edema.   Lymphadenopathy:      Cervical: No cervical adenopathy.   Skin:     General: Skin is warm and dry.   Neurological:      General: No focal deficit present.      Mental Status: She is alert.         ASSESSMENT / PLAN:   Alexia was seen today for physical and imm/inj.    Diagnoses and all orders for this visit:    Annual physical exam  -     CBC with Platelets (Today); Future  -     COMPREHENSIVE METABOLIC PANEL; Future  -     Lipid Profile; Future    Osteoporosis, unspecified osteoporosis type, unspecified pathological fracture presence  She is not interested in taking medications for osteoporosis.  Plan to check DEXA scan every 2 years.  Continue calcium and vitamin D supplement " daily    Other hyperlipidemia  Coronary calcium score is 0.  Not on medication at this time.    Vitamin D deficiency  -     Vitamin D Deficiency    Need for prophylactic vaccination and inoculation against influenza  Flu shot    Other orders  -     INFLUENZA VACCINE 65+ (FLUZONE HD)        Patient has been advised of split billing requirements and indicates understanding: Yes      COUNSELING:  Reviewed preventive health counseling, as reflected in patient instructions       Healthy diet/nutrition        She reports that she has never smoked. She has never used smokeless tobacco.      Appropriate preventive services were discussed with this patient, including applicable screening as appropriate for cardiovascular disease, diabetes, osteopenia/osteoporosis, and glaucoma.  As appropriate for age/gender, discussed screening for colorectal cancer, prostate cancer, breast cancer, and cervical cancer. Checklist reviewing preventive services available has been given to the patient.    Reviewed patients plan of care and provided an AVS. The Basic Care Plan (routine screening as documented in Health Maintenance) for Alexia meets the Care Plan requirement. This Care Plan has been established and reviewed with the Patient.          Nell Faulkner MD  [facility]    Identified Health Risks:  I have reviewed Opioid Use Disorder and Substance Use Disorder risk factors and made any needed referrals.

## 2023-09-06 LAB — DEPRECATED CALCIDIOL+CALCIFEROL SERPL-MC: 67 UG/L (ref 20–75)

## 2023-09-28 ENCOUNTER — HOSPITAL ENCOUNTER (OUTPATIENT)
Dept: MAMMOGRAPHY | Facility: CLINIC | Age: 68
Discharge: HOME | End: 2023-09-28
Attending: INTERNAL MEDICINE | Admitting: INTERNAL MEDICINE
Payer: COMMERCIAL

## 2023-09-28 DIAGNOSIS — Z12.31 VISIT FOR SCREENING MAMMOGRAM: ICD-10-CM

## 2023-09-28 PROCEDURE — 77067 SCR MAMMO BI INCL CAD: CPT

## 2024-01-08 ENCOUNTER — TRANSFERRED RECORDS (OUTPATIENT)
Dept: HEALTH INFORMATION MANAGEMENT | Facility: CLINIC | Age: 69
End: 2024-01-08
Payer: COMMERCIAL

## 2024-09-06 ENCOUNTER — TRANSFERRED RECORDS (OUTPATIENT)
Dept: HEALTH INFORMATION MANAGEMENT | Facility: CLINIC | Age: 69
End: 2024-09-06
Payer: COMMERCIAL

## 2024-10-01 ENCOUNTER — HOSPITAL ENCOUNTER (OUTPATIENT)
Dept: MAMMOGRAPHY | Facility: CLINIC | Age: 69
Discharge: HOME | End: 2024-10-01
Attending: INTERNAL MEDICINE | Admitting: INTERNAL MEDICINE
Payer: COMMERCIAL

## 2024-10-01 DIAGNOSIS — Z12.31 VISIT FOR SCREENING MAMMOGRAM: ICD-10-CM

## 2024-10-01 PROCEDURE — 77063 BREAST TOMOSYNTHESIS BI: CPT

## 2024-10-03 SDOH — HEALTH STABILITY: PHYSICAL HEALTH: ON AVERAGE, HOW MANY DAYS PER WEEK DO YOU ENGAGE IN MODERATE TO STRENUOUS EXERCISE (LIKE A BRISK WALK)?: 6 DAYS

## 2024-10-03 SDOH — HEALTH STABILITY: PHYSICAL HEALTH: ON AVERAGE, HOW MANY MINUTES DO YOU ENGAGE IN EXERCISE AT THIS LEVEL?: 60 MIN

## 2024-10-03 ASSESSMENT — SOCIAL DETERMINANTS OF HEALTH (SDOH): HOW OFTEN DO YOU GET TOGETHER WITH FRIENDS OR RELATIVES?: MORE THAN THREE TIMES A WEEK

## 2024-10-08 ENCOUNTER — OFFICE VISIT (OUTPATIENT)
Dept: FAMILY MEDICINE | Facility: CLINIC | Age: 69
End: 2024-10-08
Payer: COMMERCIAL

## 2024-10-08 VITALS
DIASTOLIC BLOOD PRESSURE: 74 MMHG | TEMPERATURE: 97.8 F | OXYGEN SATURATION: 99 % | RESPIRATION RATE: 14 BRPM | SYSTOLIC BLOOD PRESSURE: 118 MMHG | WEIGHT: 133.3 LBS | HEIGHT: 63 IN | BODY MASS INDEX: 23.62 KG/M2 | HEART RATE: 86 BPM

## 2024-10-08 DIAGNOSIS — Z80.7 FAMILY HISTORY OF NON-HODGKIN LYMPHOMA: ICD-10-CM

## 2024-10-08 DIAGNOSIS — Z00.00 ENCOUNTER FOR MEDICARE ANNUAL WELLNESS EXAM: ICD-10-CM

## 2024-10-08 DIAGNOSIS — M81.0 OSTEOPOROSIS, UNSPECIFIED OSTEOPOROSIS TYPE, UNSPECIFIED PATHOLOGICAL FRACTURE PRESENCE: ICD-10-CM

## 2024-10-08 DIAGNOSIS — E78.49 OTHER HYPERLIPIDEMIA: ICD-10-CM

## 2024-10-08 DIAGNOSIS — Z13.1 SCREENING FOR DIABETES MELLITUS: ICD-10-CM

## 2024-10-08 DIAGNOSIS — Z00.00 ANNUAL PHYSICAL EXAM: Primary | ICD-10-CM

## 2024-10-08 DIAGNOSIS — Z13.21 ENCOUNTER FOR VITAMIN DEFICIENCY SCREENING: ICD-10-CM

## 2024-10-08 PROBLEM — E78.5 ELEVATED FASTING LIPID PROFILE: Status: ACTIVE | Noted: 2024-10-08

## 2024-10-08 PROBLEM — K30 FUNCTIONAL DYSPEPSIA: Status: ACTIVE | Noted: 2024-10-08

## 2024-10-08 LAB
ALBUMIN SERPL BCG-MCNC: 4.6 G/DL (ref 3.5–5.2)
ALP SERPL-CCNC: 84 U/L (ref 40–150)
ALT SERPL W P-5'-P-CCNC: 15 U/L (ref 0–50)
ANION GAP SERPL CALCULATED.3IONS-SCNC: 11 MMOL/L (ref 7–15)
AST SERPL W P-5'-P-CCNC: 17 U/L (ref 0–45)
BASOPHILS # BLD AUTO: 0 10E3/UL (ref 0–0.2)
BASOPHILS NFR BLD AUTO: 1 %
BILIRUB SERPL-MCNC: 0.7 MG/DL
BUN SERPL-MCNC: 14.1 MG/DL (ref 8–23)
CALCIUM SERPL-MCNC: 9.7 MG/DL (ref 8.8–10.4)
CHLORIDE SERPL-SCNC: 105 MMOL/L (ref 98–107)
CHOLEST SERPL-MCNC: 219 MG/DL
CREAT SERPL-MCNC: 0.68 MG/DL (ref 0.51–0.95)
EGFRCR SERPLBLD CKD-EPI 2021: >90 ML/MIN/1.73M2
EOSINOPHIL # BLD AUTO: 0 10E3/UL (ref 0–0.7)
EOSINOPHIL NFR BLD AUTO: 1 %
ERYTHROCYTE [DISTWIDTH] IN BLOOD BY AUTOMATED COUNT: 12.3 % (ref 10–15)
EST. AVERAGE GLUCOSE BLD GHB EST-MCNC: 111 MG/DL
FASTING STATUS PATIENT QL REPORTED: YES
FASTING STATUS PATIENT QL REPORTED: YES
GLUCOSE SERPL-MCNC: 97 MG/DL (ref 70–99)
HBA1C MFR BLD: 5.5 % (ref 0–5.6)
HCO3 SERPL-SCNC: 26 MMOL/L (ref 22–29)
HCT VFR BLD AUTO: 41.7 % (ref 35–47)
HDLC SERPL-MCNC: 59 MG/DL
HGB BLD-MCNC: 13.5 G/DL (ref 11.7–15.7)
IMM GRANULOCYTES # BLD: 0 10E3/UL
IMM GRANULOCYTES NFR BLD: 0 %
LDLC SERPL CALC-MCNC: 144 MG/DL
LYMPHOCYTES # BLD AUTO: 1.3 10E3/UL (ref 0.8–5.3)
LYMPHOCYTES NFR BLD AUTO: 26 %
MCH RBC QN AUTO: 29.4 PG (ref 26.5–33)
MCHC RBC AUTO-ENTMCNC: 32.4 G/DL (ref 31.5–36.5)
MCV RBC AUTO: 91 FL (ref 78–100)
MONOCYTES # BLD AUTO: 0.5 10E3/UL (ref 0–1.3)
MONOCYTES NFR BLD AUTO: 10 %
NEUTROPHILS # BLD AUTO: 3.2 10E3/UL (ref 1.6–8.3)
NEUTROPHILS NFR BLD AUTO: 63 %
NONHDLC SERPL-MCNC: 160 MG/DL
PLATELET # BLD AUTO: 282 10E3/UL (ref 150–450)
POTASSIUM SERPL-SCNC: 4.1 MMOL/L (ref 3.4–5.3)
PROT SERPL-MCNC: 7.2 G/DL (ref 6.4–8.3)
RBC # BLD AUTO: 4.59 10E6/UL (ref 3.8–5.2)
SODIUM SERPL-SCNC: 142 MMOL/L (ref 135–145)
TRIGL SERPL-MCNC: 82 MG/DL
VIT B12 SERPL-MCNC: 1147 PG/ML (ref 232–1245)
VIT D+METAB SERPL-MCNC: 40 NG/ML (ref 20–50)
WBC # BLD AUTO: 5 10E3/UL (ref 4–11)

## 2024-10-08 PROCEDURE — 83036 HEMOGLOBIN GLYCOSYLATED A1C: CPT

## 2024-10-08 PROCEDURE — G0439 PPPS, SUBSEQ VISIT: HCPCS

## 2024-10-08 PROCEDURE — 36415 COLL VENOUS BLD VENIPUNCTURE: CPT

## 2024-10-08 PROCEDURE — 99213 OFFICE O/P EST LOW 20 MIN: CPT | Mod: 25

## 2024-10-08 PROCEDURE — 82306 VITAMIN D 25 HYDROXY: CPT

## 2024-10-08 PROCEDURE — 82607 VITAMIN B-12: CPT

## 2024-10-08 PROCEDURE — 80053 COMPREHEN METABOLIC PANEL: CPT

## 2024-10-08 PROCEDURE — 80061 LIPID PANEL: CPT

## 2024-10-08 PROCEDURE — 85025 COMPLETE CBC W/AUTO DIFF WBC: CPT

## 2024-10-08 RX ORDER — VALACYCLOVIR HYDROCHLORIDE 1 G/1
1000 TABLET, FILM COATED ORAL DAILY
COMMUNITY
Start: 2024-01-16

## 2024-10-08 NOTE — PROGRESS NOTES
Preventive Care Visit  [facility]  Luis Estrada DO, Family Medicine  Oct 8, 2024      Assessment & Plan     Encounter for Medicare annual wellness exam  Annual physical exam  Pleasant 69-year-old female presenting to [facility] and have her medical annual wellness  She reports that she is doing well  She has a history of osteoporosis as below  She continues to see a naturopath and take many vitamins  She is exercising almost every day of the week with yoga and 3 times per week strength training  She follows a not processed food diet  Lives with her  who is struggling with Parkinson's  She has 3 children and 3 stepchildren with 8 grandchildren  She likes to stay active    Mammogram last 10/1/24 - normal, repeat 1 year   Pap: s/p hysterectomy in 1998   Colon Cancer: 07/30/2020 - repeat in 10 years  Dental: Goes approximately twice per year, recommend twice per year  Vision: goes every 6 months  IPV screening: Feels safe at home.    Social determinants of health: no housing insecurity, appropriate health literacy, no food insecurities  Lipid profile: ordered today  Flu vaccine: 09/15/2024  RSV: not indicated  COVID-vaccine: 09/15/24    **May need to consider giving a Prevnar 20 if she has had a Prevnar 13 in 23 in 2021 and 2022    Should be getting 5 servings of fruits and vegetables per day  Should be completing 150 minutes of brisk physical activity a week    Follow Up with PCP in 1 year or sooner for acute complaints      Other hyperlipidemia  Previous history of hypercholesterolemia  Did have a CT of the cardiac for calcifications which was negative  Based on lab values she would benefit from statin medication  We will get a lipid profile and calculate ASCVD and let her know but she is not particularly wanting to start statin medication  - Lipid panel reflex to direct LDL Non-fasting; Future  - Comprehensive metabolic panel (BMP + Alb, Alk Phos, ALT, AST, Total. Bili, TP);  Future  - Lipid panel reflex to direct LDL Non-fasting  - Comprehensive metabolic panel (BMP + Alb, Alk Phos, ALT, AST, Total. Bili, TP)      Osteoporosis, unspecified osteoporosis type, unspecified pathological fracture presence  Last DEXA was in September 2022 which showed osteoporosis in the right hip femoral neck had a -2.9 Z-score as well as a right hip total with a Z-score -2.5  We are going to repeat today as she will get this covered every 2 years  She is taking many vitamins for her osteoporosis  We will check a CMP as well as a vitamin D  Recommended bone up by [facility] for bone health  She is going to talk to her naturopath and see what the results are  - DX Bone Density; Future  - Vitamin D Deficiency; Future  - Vitamin D Deficiency    ------  9/27/2022  FINDINGS:     Lumbar Spine: L1-L3: BMD: 0.790 g/cm2. T-score: -3.2. Z-score: -1.5  RIGHT Hip Total: BMD: 0.687 g/cm2. T-score: -2.5. Z-score: -1.2  RIGHT Hip Femoral neck: BMD: 0.641 g/cm2. T-score: -2.9. Z-score: -1.3  LEFT Hip Total: BMD: 0.711 g/cm2. T-score: -2.4. Z-score: -1.0  LEFT Hip Femoral neck: BMD: 0.650 g/cm2. T-score: -2.8. Z-score: -1.2  -----    Family history of non-Hodgkin lymphoma  Mother passed from non-Hodgkin's lymphoma  Will check a CBC to ensure that she does not have any blood disorders  - CBC with platelets and differential; Future  - CBC with platelets and differential    Encounter for vitamin deficiency screening  Has been feeling some fatigue so we will check a B12  History of osteoporosis we will check a vitamin D  Will phone patient with results  - Vitamin D Deficiency; Future  - Vitamin B12; Future  - Comprehensive metabolic panel (BMP + Alb, Alk Phos, ALT, AST, Total. Bili, TP); Future  - Vitamin D Deficiency  - Vitamin B12  - Comprehensive metabolic panel (BMP + Alb, Alk Phos, ALT, AST, Total. Bili, TP)    Screening for diabetes mellitus  No history of diabetes I want to ensure that she does not have any predisposition if  she does have hypercholesterolemia  - Hemoglobin A1c; Future  - Comprehensive metabolic panel (BMP + Alb, Alk Phos, ALT, AST, Total. Bili, TP); Future  - Hemoglobin A1c  - Comprehensive metabolic panel (BMP + Alb, Alk Phos, ALT, AST, Total. Bili, TP)      Counseling  Appropriate preventive services were addressed with this patient via screening, questionnaire, or discussion as appropriate for fall prevention, nutrition, physical activity, Tobacco-use cessation, social engagement, weight loss and cognition.  Checklist reviewing preventive services available has been given to the patient.  Reviewed patient's diet, addressing concerns and/or questions.       Subjective   Alexia Malone is a 69 year old, presenting for the following:  Wellness Visit (Fasting. )        10/8/2024     8:45 AM   Additional Questions   Roomed by nohelia nj         Health Care Directive  Patient does not have a Health Care Directive or Living Will: Discussed advance care planning with patient; information given to patient to review.    HPI    Retired OB Nurse  Lives with  who is struggling from Parkinsons  Has 3 kids and 3 step kids  Sees a naturopath  Hx of osteoporesis  Yoga every day and 3x per week resistance training        GENERAL: No fever, chills, weight loss or gain  HEENT: No headache, trauma, changes in vision, hearing, nasal congestion, dental pain, neck pain, sore throat.  CARDIAC: Denies chest pain or shortness of breath  LUNG: Denies dyspnea on exertion or chest pain  ABD: Denies pain, nausea, vomiting, diarrhea, constipation  : No changes in bladder habits  SKIN: Denies new rashes  NEURO: No numbness, weakness, tingling   MSK: No weakness  PSYCH: No changes in mood, no HI or SI        10/3/2024   General Health   How would you rate your overall physical health? Excellent   Feel stress (tense, anxious, or unable to sleep) Not at all            10/3/2024   Nutrition   Diet: Regular (no restrictions)            10/3/2024    Exercise   Days per week of moderate/strenous exercise 6 days   Average minutes spent exercising at this level 60 min            10/3/2024   Social Factors   Frequency of gathering with friends or relatives More than three times a week   Worry food won't last until get money to buy more No   Food not last or not have enough money for food? No   Do you have housing? (Housing is defined as stable permanent housing and does not include staying ouside in a car, in a tent, in an abandoned building, in an overnight shelter, or couch-surfing.) Yes   Are you worried about losing your housing? No   Lack of transportation? No   Unable to get utilities (heat,electricity)? No            10/3/2024   Fall Risk   Fallen 2 or more times in the past year? No   Trouble with walking or balance? No             10/3/2024   Activities of Daily Living- Home Safety   Needs help with the following daily activites None of the above   Safety concerns in the home None of the above            10/3/2024   Dental   Dentist two times every year? Yes            10/3/2024   Hearing Screening   Hearing concerns? None of the above            10/3/2024   Driving Risk Screening   Patient/family members have concerns about driving No            10/3/2024   General Alertness/Fatigue Screening   Have you been more tired than usual lately? No            10/3/2024   Urinary Incontinence Screening   Bothered by leaking urine in past 6 months No            10/3/2024   TB Screening   Were you born outside of the [place]? Yes            Today's PHQ-2 Score:       10/7/2024     8:20 PM   PHQ-2 ( 1999 [employer])   Q1: Little interest or pleasure in doing things 0   Q2: Feeling down, depressed or hopeless 0   PHQ-2 Score 0   Q1: Little interest or pleasure in doing things Not at all   Q2: Feeling down, depressed or hopeless Not at all   PHQ-2 Score 0           10/3/2024   Substance Use   Alcohol more than 3/day or more than 7/wk No   Do you have a current opioid  prescription? No   How severe/bad is pain from 1 to 10? 0/10 (No Pain)   Do you use any other substances recreationally? No        Social History     Tobacco Use    Smoking status: Never     Passive exposure: Never    Smokeless tobacco: Never   Vaping Use    Vaping status: Never Used   Substance Use Topics    Alcohol use: Yes     Alcohol/week: 4.0 standard drinks of alcohol    Drug use: No           10/1/2024   LAST FHS-7 RESULTS   1st degree relative breast or ovarian cancer No   Any relative bilateral breast cancer No   Any male have breast cancer No   Any ONE woman have BOTH breast AND ovarian cancer No   Any woman with breast cancer before 50yrs No   2 or more relatives with breast AND/OR ovarian cancer No   2 or more relatives with breast AND/OR bowel cancer No           Mammogram Screening - Mammogram every 1-2 years updated in Health Maintenance based on mutual decision making      History of abnormal Pap smear: Status post hysterectomy with removal of cervix and no history of CIN2 or greater or cervical cancer. Health Maintenance and Surgical History updated.        2017     8:11 AM   PAP / HPV   PAP-ABSTRACT See Scanned Document           This result is from an external source.     ASCVD Risk   The 10-year ASCVD risk score (Agustin TEMPLE, et al., 2019) is: 7.6%    Values used to calculate the score:      Age: 69 years      Sex: Female      Is Non- : No      Diabetic: No      Tobacco smoker: No      Systolic Blood Pressure: 118 mmHg      Is BP treated: No      HDL Cholesterol: 69 mg/dL      Total Cholesterol: 260 mg/dL    Fracture Risk Assessment Tool  Link to Frax Calculator  Use the information below to complete the Frax calculator  : 1955  Sex: female  Weight (kg): 60.5 kg (actual weight)  Height (cm): 160.5 cm  Previous Fragility Fracture:  No  History of parent with fractured hip:  No  Current Smoking:  No  Patient has been on glucocorticoids for more than 3  "months (5mg/day or more): No  Rheumatoid Arthritis on Problem List:  No  Secondary Osteoporosis on Problem List:  No  Consumes 3 or more units of alcohol per day: No  Femoral Neck BMD (g/cm2)            Reviewed and updated as needed this visit by Provider   Tobacco   Meds  Problems  Med Hx  Surg Hx  Fam Hx  Soc Hx Sexual   Activity            Current providers sharing in care for this patient include:  Patient Care Team:  Luis Estrada DO as PCP - General (Family Medicine)  Avinash Longoria MD as Resident (Dermatology)  Nell Faulkner MD as Assigned PCP    The following health maintenance items are reviewed in Epic and correct as of today:  Health Maintenance   Topic Date Due    LIPID  09/05/2024    DTAP/TDAP/TD IMMUNIZATION (2 - Td or Tdap) 04/14/2025    MEDICARE ANNUAL WELLNESS VISIT  10/08/2025    ANNUAL REVIEW OF HM ORDERS  10/08/2025    FALL RISK ASSESSMENT  10/08/2025    GLUCOSE  09/05/2026    MAMMO SCREENING  10/01/2026    ADVANCE CARE PLANNING  10/08/2029    RSV VACCINE (1 - 1-dose 75+ series) 01/04/2030    COLORECTAL CANCER SCREENING  07/30/2030    DEXA  09/27/2037    PHQ-2 (once per calendar year)  Completed    INFLUENZA VACCINE  Completed    Pneumococcal Vaccine: 65+ Years  Completed    ZOSTER IMMUNIZATION  Completed    COVID-19 Vaccine  Completed    HPV IMMUNIZATION  Aged Out    MENINGITIS IMMUNIZATION  Aged Out    RSV MONOCLONAL ANTIBODY  Aged Out    HEPATITIS C SCREENING  Discontinued            Objective    Exam  /74   Pulse 86   Temp 97.8  F (36.6  C) (Oral)   Resp 14   Ht 1.605 m (5' 3.19\")   Wt 60.5 kg (133 lb 4.8 oz)   SpO2 99%   BMI 23.47 kg/m     Estimated body mass index is 23.47 kg/m  as calculated from the following:    Height as of this encounter: 1.605 m (5' 3.19\").    Weight as of this encounter: 60.5 kg (133 lb 4.8 oz).    Physical Exam  Vitals reviewed.   Constitutional:       Appearance: Normal appearance.   HENT:      Head: Normocephalic and atraumatic.    "   Right Ear: Tympanic membrane, ear canal and external ear normal.      Left Ear: Tympanic membrane, ear canal and external ear normal.      Nose: Nose normal.      Mouth/Throat:      Mouth: Mucous membranes are moist.   Eyes:      Extraocular Movements: Extraocular movements intact.      Pupils: Pupils are equal, round, and reactive to light.   Cardiovascular:      Rate and Rhythm: Normal rate and regular rhythm.      Heart sounds: Normal heart sounds.   Pulmonary:      Effort: Pulmonary effort is normal.      Breath sounds: Normal breath sounds.   Abdominal:      General: Abdomen is flat. Bowel sounds are normal.      Palpations: Abdomen is soft.   Musculoskeletal:      Cervical back: Normal range of motion and neck supple.   Skin:     General: Skin is warm and dry.   Neurological:      General: No focal deficit present.      Mental Status: She is alert.   Psychiatric:         Mood and Affect: Mood normal.         Behavior: Behavior normal.             10/8/2024   Mini Cog   Clock Draw Score 2 Normal   3 Item Recall 3 objects recalled   Mini Cog Total Score 5                 Signed Electronically by: Luis Estrada DO

## 2024-10-08 NOTE — PATIENT INSTRUCTIONS
Patient Education   Preventive Care Advice   This is general advice given by our system to help you stay healthy. However, your care team may have specific advice just for you. Please talk to your care team about your preventive care needs.  Nutrition  Eat 5 or more servings of fruits and vegetables each day.  Try wheat bread, brown rice and whole grain pasta (instead of white bread, rice, and pasta).  Get enough calcium and vitamin D. Check the label on foods and aim for 100% of the RDA (recommended daily allowance).  Lifestyle  Exercise at least 150 minutes each week  (30 minutes a day, 5 days a week).  Do muscle strengthening activities 2 days a week. These help control your weight and prevent disease.  No smoking.  Wear sunscreen to prevent skin cancer.  Have a dental exam and cleaning every 6 months.  Yearly exams  See your health care team every year to talk about:  Any changes in your health.  Any medicines your care team has prescribed.  Preventive care, family planning, and ways to prevent chronic diseases.  Shots (vaccines)   HPV shots (up to age 26), if you've never had them before.  Hepatitis B shots (up to age 59), if you've never had them before.  COVID-19 shot: Get this shot when it's due.  Flu shot: Get a flu shot every year.  Tetanus shot: Get a tetanus shot every 10 years.  Pneumococcal, hepatitis A, and RSV shots: Ask your care team if you need these based on your risk.  Shingles shot (for age 50 and up)  General health tests  Diabetes screening:  Starting at age 35, Get screened for diabetes at least every 3 years.  If you are younger than age 35, ask your care team if you should be screened for diabetes.  Cholesterol test: At age 39, start having a cholesterol test every 5 years, or more often if advised.  Bone density scan (DEXA): At age 50, ask your care team if you should have this scan for osteoporosis (brittle bones).  Hepatitis C: Get tested at least once in your life.  STIs (sexually  transmitted infections)  Before age 24: Ask your care team if you should be screened for STIs.  After age 24: Get screened for STIs if you're at risk. You are at risk for STIs (including HIV) if:  You are sexually active with more than one person.  You don't use condoms every time.  You or a partner was diagnosed with a sexually transmitted infection.  If you are at risk for HIV, ask about PrEP medicine to prevent HIV.  Get tested for HIV at least once in your life, whether you are at risk for HIV or not.  Cancer screening tests  Cervical cancer screening: If you have a cervix, begin getting regular cervical cancer screening tests starting at age 21.  Breast cancer scan (mammogram): If you've ever had breasts, begin having regular mammograms starting at age 40. This is a scan to check for breast cancer.  Colon cancer screening: It is important to start screening for colon cancer at age 45.  Have a colonoscopy test every 10 years (or more often if you're at risk) Or, ask your provider about stool tests like a FIT test every year or Cologuard test every 3 years.  To learn more about your testing options, visit:   .  For help making a decision, visit:   https://bit.ly/vn52850.  Prostate cancer screening test: If you have a prostate, ask your care team if a prostate cancer screening test (PSA) at age 55 is right for you.  Lung cancer screening: If you are a current or former smoker ages 50 to 80, ask your care team if ongoing lung cancer screenings are right for you.  For informational purposes only. Not to replace the advice of your health care provider. Copyright   2023 [facility]. All rights reserved. Clinically reviewed by the [facility] Transitions Program. [employer] 218151 - REV 01/24.  Eating Healthy Foods: Care Instructions  With every meal, you can make healthy food choices. Try to eat a variety of fruits, vegetables, whole grains, lean proteins, and low-fat dairy products. This can help  "you get the right balance of nutrients, including vitamins and minerals. Small changes add up over time. You can start by adding one healthy food to your meals each day.    Try to make half your plate fruits and vegetables, one-fourth whole grains, and one-fourth lean proteins. Try including dairy with your meals.   Eat more fruits and vegetables. Try to have them with most meals and snacks.   Foods for healthy eating        Fruits   These can be fresh, frozen, canned, or dried.  Try to choose whole fruit rather than fruit juice.  Eat a variety of colors.        Vegetables   These can be fresh, frozen, canned, or dried.  Beans, peas, and lentils count too.        Whole grains   Choose whole-grain breads, cereals, and noodles.  Try brown rice.        Lean proteins   These can include lean meat, poultry, fish, and eggs.  You can also have tofu, beans, peas, lentils, nuts, and seeds.        Dairy   Try milk, yogurt, and cheese.  Choose low-fat or fat-free when you can.  If you need to, use lactose-free milk or fortified plant-based milk products, such as soy milk.        Water   Drink water when you're thirsty.  Limit sugar-sweetened drinks, including soda, fruit drinks, and sports drinks.  Where can you learn more?  Go to https://www.[employer].net/patiented  Enter T756 in the search box to learn more about \"Eating Healthy Foods: Care Instructions.\"  Current as of: September 20, 2023  Content Version: 14.2 2024 [facility].   Care instructions adapted under license by your healthcare professional. If you have questions about a medical condition or this instruction, always ask your healthcare professional. [employer] disclaims any warranty or liability for your use of this information.       "

## 2024-10-09 ENCOUNTER — PATIENT OUTREACH (OUTPATIENT)
Dept: CARE COORDINATION | Facility: CLINIC | Age: 69
End: 2024-10-09
Payer: COMMERCIAL

## 2024-10-24 ENCOUNTER — ANCILLARY PROCEDURE (OUTPATIENT)
Dept: BONE DENSITY | Facility: CLINIC | Age: 69
End: 2024-10-24
Payer: COMMERCIAL

## 2024-10-24 DIAGNOSIS — M81.0 OSTEOPOROSIS, UNSPECIFIED OSTEOPOROSIS TYPE, UNSPECIFIED PATHOLOGICAL FRACTURE PRESENCE: ICD-10-CM

## 2024-10-24 PROCEDURE — 77080 DXA BONE DENSITY AXIAL: CPT | Mod: TC | Performed by: PHYSICIAN ASSISTANT

## 2024-10-24 PROCEDURE — 77091 TBS TECHL CALCULATION ONLY: CPT | Performed by: PHYSICIAN ASSISTANT

## 2025-01-02 ENCOUNTER — TRANSFERRED RECORDS (OUTPATIENT)
Dept: HEALTH INFORMATION MANAGEMENT | Facility: CLINIC | Age: 70
End: 2025-01-02
Payer: COMMERCIAL

## 2025-07-07 ENCOUNTER — TRANSFERRED RECORDS (OUTPATIENT)
Dept: HEALTH INFORMATION MANAGEMENT | Facility: CLINIC | Age: 70
End: 2025-07-07
Payer: COMMERCIAL